# Patient Record
Sex: MALE | Race: WHITE | URBAN - METROPOLITAN AREA
[De-identification: names, ages, dates, MRNs, and addresses within clinical notes are randomized per-mention and may not be internally consistent; named-entity substitution may affect disease eponyms.]

---

## 2017-04-16 ENCOUNTER — EMERGENCY (EMERGENCY)
Facility: HOSPITAL | Age: 57
LOS: 0 days | Discharge: HOME | End: 2017-04-17
Admitting: INTERNAL MEDICINE

## 2017-04-16 PROBLEM — Z00.00 ENCOUNTER FOR PREVENTIVE HEALTH EXAMINATION: Status: ACTIVE | Noted: 2017-04-16

## 2017-06-27 DIAGNOSIS — S80.211A ABRASION, RIGHT KNEE, INITIAL ENCOUNTER: ICD-10-CM

## 2017-06-27 DIAGNOSIS — F32.9 MAJOR DEPRESSIVE DISORDER, SINGLE EPISODE, UNSPECIFIED: ICD-10-CM

## 2017-06-27 DIAGNOSIS — F10.129 ALCOHOL ABUSE WITH INTOXICATION, UNSPECIFIED: ICD-10-CM

## 2017-06-27 DIAGNOSIS — S80.212A ABRASION, LEFT KNEE, INITIAL ENCOUNTER: ICD-10-CM
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
## 2017-06-27 DIAGNOSIS — Z98.890 OTHER SPECIFIED POSTPROCEDURAL STATES: ICD-10-CM

## 2017-06-27 DIAGNOSIS — R00.0 TACHYCARDIA, UNSPECIFIED: ICD-10-CM

## 2017-11-25 ENCOUNTER — EMERGENCY (EMERGENCY)
Facility: HOSPITAL | Age: 57
LOS: 0 days | Discharge: LEFT BEFORE DISCHARGE | End: 2017-11-25

## 2017-11-25 DIAGNOSIS — R56.9 UNSPECIFIED CONVULSIONS: ICD-10-CM

## 2017-11-25 DIAGNOSIS — K85.90 ACUTE PANCREATITIS WITHOUT NECROSIS OR INFECTION, UNSPECIFIED: ICD-10-CM

## 2017-11-25 DIAGNOSIS — F10.129 ALCOHOL ABUSE WITH INTOXICATION, UNSPECIFIED: ICD-10-CM

## 2017-11-25 DIAGNOSIS — F10.20 ALCOHOL DEPENDENCE, UNCOMPLICATED: ICD-10-CM

## 2017-11-25 DIAGNOSIS — F31.9 BIPOLAR DISORDER, UNSPECIFIED: ICD-10-CM

## 2017-11-25 DIAGNOSIS — F93.0 SEPARATION ANXIETY DISORDER OF CHILDHOOD: ICD-10-CM
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
## 2017-11-25 DIAGNOSIS — F32.9 MAJOR DEPRESSIVE DISORDER, SINGLE EPISODE, UNSPECIFIED: ICD-10-CM

## 2017-11-25 DIAGNOSIS — Z79.899 OTHER LONG TERM (CURRENT) DRUG THERAPY: ICD-10-CM

## 2017-11-25 DIAGNOSIS — Z98.890 OTHER SPECIFIED POSTPROCEDURAL STATES: ICD-10-CM

## 2017-11-25 DIAGNOSIS — E66.9 OBESITY, UNSPECIFIED: ICD-10-CM

## 2017-11-25 DIAGNOSIS — F41.9 ANXIETY DISORDER, UNSPECIFIED: ICD-10-CM

## 2017-11-25 DIAGNOSIS — K27.9 PEPTIC ULCER, SITE UNSPECIFIED, UNSPECIFIED AS ACUTE OR CHRONIC, WITHOUT HEMORRHAGE OR PERFORATION: ICD-10-CM

## 2017-11-25 DIAGNOSIS — K31.1 ADULT HYPERTROPHIC PYLORIC STENOSIS: ICD-10-CM

## 2017-11-25 DIAGNOSIS — F10.230 ALCOHOL DEPENDENCE WITH WITHDRAWAL, UNCOMPLICATED: ICD-10-CM

## 2017-11-25 DIAGNOSIS — R07.89 OTHER CHEST PAIN: ICD-10-CM

## 2017-11-25 DIAGNOSIS — K56.609 UNSPECIFIED INTESTINAL OBSTRUCTION, UNSPECIFIED AS TO PARTIAL VERSUS COMPLETE OBSTRUCTION: ICD-10-CM

## 2017-12-08 ENCOUNTER — INPATIENT (INPATIENT)
Facility: HOSPITAL | Age: 57
LOS: 2 days | Discharge: HOME | End: 2017-12-11
Attending: INTERNAL MEDICINE | Admitting: INTERNAL MEDICINE

## 2017-12-08 DIAGNOSIS — R07.89 OTHER CHEST PAIN: ICD-10-CM

## 2017-12-08 DIAGNOSIS — R56.9 UNSPECIFIED CONVULSIONS: ICD-10-CM

## 2017-12-08 DIAGNOSIS — K31.1 ADULT HYPERTROPHIC PYLORIC STENOSIS: ICD-10-CM

## 2017-12-08 DIAGNOSIS — K85.90 ACUTE PANCREATITIS WITHOUT NECROSIS OR INFECTION, UNSPECIFIED: ICD-10-CM

## 2017-12-08 DIAGNOSIS — F93.0 SEPARATION ANXIETY DISORDER OF CHILDHOOD: ICD-10-CM

## 2017-12-08 DIAGNOSIS — K27.9 PEPTIC ULCER, SITE UNSPECIFIED, UNSPECIFIED AS ACUTE OR CHRONIC, WITHOUT HEMORRHAGE OR PERFORATION: ICD-10-CM

## 2017-12-08 DIAGNOSIS — K56.609 UNSPECIFIED INTESTINAL OBSTRUCTION, UNSPECIFIED AS TO PARTIAL VERSUS COMPLETE OBSTRUCTION: ICD-10-CM

## 2017-12-08 DIAGNOSIS — F10.230 ALCOHOL DEPENDENCE WITH WITHDRAWAL, UNCOMPLICATED: ICD-10-CM

## 2017-12-08 DIAGNOSIS — F31.9 BIPOLAR DISORDER, UNSPECIFIED: ICD-10-CM

## 2017-12-08 DIAGNOSIS — F41.9 ANXIETY DISORDER, UNSPECIFIED: ICD-10-CM

## 2017-12-08 DIAGNOSIS — F10.20 ALCOHOL DEPENDENCE, UNCOMPLICATED: ICD-10-CM

## 2017-12-08 DIAGNOSIS — E66.9 OBESITY, UNSPECIFIED: ICD-10-CM
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
## 2017-12-19 DIAGNOSIS — E87.6 HYPOKALEMIA: ICD-10-CM

## 2017-12-19 DIAGNOSIS — R06.02 SHORTNESS OF BREATH: ICD-10-CM

## 2017-12-19 DIAGNOSIS — R07.9 CHEST PAIN, UNSPECIFIED: ICD-10-CM

## 2017-12-19 DIAGNOSIS — F10.239 ALCOHOL DEPENDENCE WITH WITHDRAWAL, UNSPECIFIED: ICD-10-CM

## 2017-12-19 DIAGNOSIS — K86.1 OTHER CHRONIC PANCREATITIS: ICD-10-CM

## 2017-12-19 DIAGNOSIS — Z87.19 PERSONAL HISTORY OF OTHER DISEASES OF THE DIGESTIVE SYSTEM: ICD-10-CM

## 2017-12-19 DIAGNOSIS — K52.9 NONINFECTIVE GASTROENTERITIS AND COLITIS, UNSPECIFIED: ICD-10-CM

## 2017-12-19 DIAGNOSIS — K27.9 PEPTIC ULCER, SITE UNSPECIFIED, UNSPECIFIED AS ACUTE OR CHRONIC, WITHOUT HEMORRHAGE OR PERFORATION: ICD-10-CM

## 2017-12-19 DIAGNOSIS — R07.89 OTHER CHEST PAIN: ICD-10-CM

## 2017-12-19 DIAGNOSIS — E83.42 HYPOMAGNESEMIA: ICD-10-CM

## 2017-12-19 DIAGNOSIS — F41.9 ANXIETY DISORDER, UNSPECIFIED: ICD-10-CM

## 2017-12-19 DIAGNOSIS — K70.9 ALCOHOLIC LIVER DISEASE, UNSPECIFIED: ICD-10-CM

## 2017-12-19 DIAGNOSIS — F32.9 MAJOR DEPRESSIVE DISORDER, SINGLE EPISODE, UNSPECIFIED: ICD-10-CM

## 2018-01-25 ENCOUNTER — EMERGENCY (EMERGENCY)
Facility: HOSPITAL | Age: 58
LOS: 0 days | Discharge: HOME | End: 2018-01-26
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
## 2018-01-25 DIAGNOSIS — K56.609 UNSPECIFIED INTESTINAL OBSTRUCTION, UNSPECIFIED AS TO PARTIAL VERSUS COMPLETE OBSTRUCTION: ICD-10-CM
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
## 2018-01-25 DIAGNOSIS — F10.20 ALCOHOL DEPENDENCE, UNCOMPLICATED: ICD-10-CM

## 2018-01-25 DIAGNOSIS — F31.9 BIPOLAR DISORDER, UNSPECIFIED: ICD-10-CM

## 2018-01-25 DIAGNOSIS — K27.9 PEPTIC ULCER, SITE UNSPECIFIED, UNSPECIFIED AS ACUTE OR CHRONIC, WITHOUT HEMORRHAGE OR PERFORATION: ICD-10-CM

## 2018-01-25 DIAGNOSIS — Z23 ENCOUNTER FOR IMMUNIZATION: ICD-10-CM

## 2018-01-25 DIAGNOSIS — R07.89 OTHER CHEST PAIN: ICD-10-CM

## 2018-01-25 DIAGNOSIS — R40.1 STUPOR: ICD-10-CM

## 2018-01-25 DIAGNOSIS — K85.90 ACUTE PANCREATITIS WITHOUT NECROSIS OR INFECTION, UNSPECIFIED: ICD-10-CM

## 2018-01-25 DIAGNOSIS — F93.0 SEPARATION ANXIETY DISORDER OF CHILDHOOD: ICD-10-CM
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
## 2018-01-25 DIAGNOSIS — K31.1 ADULT HYPERTROPHIC PYLORIC STENOSIS: ICD-10-CM

## 2018-01-25 DIAGNOSIS — F41.9 ANXIETY DISORDER, UNSPECIFIED: ICD-10-CM

## 2018-01-25 DIAGNOSIS — E66.9 OBESITY, UNSPECIFIED: ICD-10-CM

## 2018-01-25 DIAGNOSIS — F10.129 ALCOHOL ABUSE WITH INTOXICATION, UNSPECIFIED: ICD-10-CM

## 2018-01-25 DIAGNOSIS — S01.81XA LACERATION WITHOUT FOREIGN BODY OF OTHER PART OF HEAD, INITIAL ENCOUNTER: ICD-10-CM

## 2018-01-25 DIAGNOSIS — R56.9 UNSPECIFIED CONVULSIONS: ICD-10-CM
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
## 2018-01-25 DIAGNOSIS — Z79.899 OTHER LONG TERM (CURRENT) DRUG THERAPY: ICD-10-CM

## 2018-01-25 DIAGNOSIS — F10.230 ALCOHOL DEPENDENCE WITH WITHDRAWAL, UNCOMPLICATED: ICD-10-CM
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
## 2018-01-25 DIAGNOSIS — Z98.890 OTHER SPECIFIED POSTPROCEDURAL STATES: ICD-10-CM

## 2018-01-25 DIAGNOSIS — F32.9 MAJOR DEPRESSIVE DISORDER, SINGLE EPISODE, UNSPECIFIED: ICD-10-CM

## 2018-02-01 ENCOUNTER — EMERGENCY (EMERGENCY)
Facility: HOSPITAL | Age: 58
LOS: 0 days | Discharge: HOME | End: 2018-02-01

## 2018-02-01 DIAGNOSIS — Z79.899 OTHER LONG TERM (CURRENT) DRUG THERAPY: ICD-10-CM

## 2018-02-01 DIAGNOSIS — F93.0 SEPARATION ANXIETY DISORDER OF CHILDHOOD: ICD-10-CM

## 2018-02-01 DIAGNOSIS — F31.9 BIPOLAR DISORDER, UNSPECIFIED: ICD-10-CM

## 2018-02-01 DIAGNOSIS — K56.609 UNSPECIFIED INTESTINAL OBSTRUCTION, UNSPECIFIED AS TO PARTIAL VERSUS COMPLETE OBSTRUCTION: ICD-10-CM

## 2018-02-01 DIAGNOSIS — E66.9 OBESITY, UNSPECIFIED: ICD-10-CM

## 2018-02-01 DIAGNOSIS — R56.9 UNSPECIFIED CONVULSIONS: ICD-10-CM

## 2018-02-01 DIAGNOSIS — S01.111D LACERATION WITHOUT FOREIGN BODY OF RIGHT EYELID AND PERIOCULAR AREA, SUBSEQUENT ENCOUNTER: ICD-10-CM
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
## 2018-02-01 DIAGNOSIS — F10.230 ALCOHOL DEPENDENCE WITH WITHDRAWAL, UNCOMPLICATED: ICD-10-CM

## 2018-02-01 DIAGNOSIS — K85.90 ACUTE PANCREATITIS WITHOUT NECROSIS OR INFECTION, UNSPECIFIED: ICD-10-CM

## 2018-02-01 DIAGNOSIS — K27.9 PEPTIC ULCER, SITE UNSPECIFIED, UNSPECIFIED AS ACUTE OR CHRONIC, WITHOUT HEMORRHAGE OR PERFORATION: ICD-10-CM

## 2018-02-01 DIAGNOSIS — F32.9 MAJOR DEPRESSIVE DISORDER, SINGLE EPISODE, UNSPECIFIED: ICD-10-CM

## 2018-02-01 DIAGNOSIS — K31.1 ADULT HYPERTROPHIC PYLORIC STENOSIS: ICD-10-CM

## 2018-02-01 DIAGNOSIS — F10.20 ALCOHOL DEPENDENCE, UNCOMPLICATED: ICD-10-CM

## 2018-02-01 DIAGNOSIS — Z98.890 OTHER SPECIFIED POSTPROCEDURAL STATES: ICD-10-CM

## 2018-02-01 DIAGNOSIS — R07.89 OTHER CHEST PAIN: ICD-10-CM

## 2018-02-01 DIAGNOSIS — F41.9 ANXIETY DISORDER, UNSPECIFIED: ICD-10-CM

## 2018-04-11 ENCOUNTER — EMERGENCY (EMERGENCY)
Facility: HOSPITAL | Age: 58
LOS: 0 days | Discharge: HOME | End: 2018-04-12
Attending: EMERGENCY MEDICINE

## 2018-04-11 VITALS
TEMPERATURE: 98 F | OXYGEN SATURATION: 98 % | RESPIRATION RATE: 20 BRPM | DIASTOLIC BLOOD PRESSURE: 80 MMHG | SYSTOLIC BLOOD PRESSURE: 121 MMHG | HEART RATE: 71 BPM

## 2018-04-11 DIAGNOSIS — S60.511A ABRASION OF RIGHT HAND, INITIAL ENCOUNTER: ICD-10-CM

## 2018-04-11 DIAGNOSIS — S30.1XXA CONTUSION OF ABDOMINAL WALL, INITIAL ENCOUNTER: ICD-10-CM
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
## 2018-04-11 DIAGNOSIS — S80.812A ABRASION, LEFT LOWER LEG, INITIAL ENCOUNTER: ICD-10-CM
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
## 2018-04-11 DIAGNOSIS — F10.129 ALCOHOL ABUSE WITH INTOXICATION, UNSPECIFIED: ICD-10-CM

## 2018-04-11 DIAGNOSIS — S80.811A ABRASION, RIGHT LOWER LEG, INITIAL ENCOUNTER: ICD-10-CM

## 2018-04-11 DIAGNOSIS — S01.81XA LACERATION WITHOUT FOREIGN BODY OF OTHER PART OF HEAD, INITIAL ENCOUNTER: ICD-10-CM
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
## 2018-04-11 LAB
ALBUMIN SERPL ELPH-MCNC: 4 G/DL — SIGNIFICANT CHANGE UP (ref 3.5–5.2)
ALLERGY+IMMUNOLOGY DIAG STUDY NOTE: SIGNIFICANT CHANGE UP
ALP SERPL-CCNC: 118 U/L — HIGH (ref 30–115)
ALT FLD-CCNC: 32 U/L — SIGNIFICANT CHANGE UP (ref 0–41)
AMYLASE P1 CFR SERPL: 77 U/L — SIGNIFICANT CHANGE UP (ref 25–115)
ANION GAP SERPL CALC-SCNC: 19 MMOL/L — HIGH (ref 7–14)
APTT BLD: 30.4 SEC — SIGNIFICANT CHANGE UP (ref 27–39.2)
AST SERPL-CCNC: 51 U/L — HIGH (ref 0–41)
BASOPHILS # BLD AUTO: 0.15 K/UL — SIGNIFICANT CHANGE UP (ref 0–0.2)
BASOPHILS NFR BLD AUTO: 1.9 % — HIGH (ref 0–1)
BILIRUB SERPL-MCNC: 0.8 MG/DL — SIGNIFICANT CHANGE UP (ref 0.2–1.2)
BUN SERPL-MCNC: 12 MG/DL — SIGNIFICANT CHANGE UP (ref 10–20)
CALCIUM SERPL-MCNC: 8 MG/DL — LOW (ref 8.5–10.1)
CHLORIDE SERPL-SCNC: 111 MMOL/L — HIGH (ref 98–110)
CO2 SERPL-SCNC: 19 MMOL/L — SIGNIFICANT CHANGE UP (ref 17–32)
CREAT SERPL-MCNC: 0.7 MG/DL — SIGNIFICANT CHANGE UP (ref 0.7–1.5)
EOSINOPHIL # BLD AUTO: 0.29 K/UL — SIGNIFICANT CHANGE UP (ref 0–0.7)
EOSINOPHIL NFR BLD AUTO: 3.7 % — SIGNIFICANT CHANGE UP (ref 0–8)
ETHANOL SERPL-MCNC: 292 MG/DL — HIGH
GLUCOSE SERPL-MCNC: 97 MG/DL — SIGNIFICANT CHANGE UP (ref 70–99)
HCT VFR BLD CALC: 43.7 % — SIGNIFICANT CHANGE UP (ref 42–52)
HGB BLD-MCNC: 15.9 G/DL — SIGNIFICANT CHANGE UP (ref 14–18)
IMM GRANULOCYTES NFR BLD AUTO: 0.6 % — HIGH (ref 0.1–0.3)
INR BLD: 1.14 RATIO — SIGNIFICANT CHANGE UP (ref 0.65–1.3)
LACTATE SERPL-SCNC: 2.9 MMOL/L — HIGH (ref 0.5–2.2)
LIDOCAIN IGE QN: <10 U/L — SIGNIFICANT CHANGE UP (ref 7–60)
LYMPHOCYTES # BLD AUTO: 2.43 K/UL — SIGNIFICANT CHANGE UP (ref 1.2–3.4)
LYMPHOCYTES # BLD AUTO: 31.2 % — SIGNIFICANT CHANGE UP (ref 20.5–51.1)
MCHC RBC-ENTMCNC: 34 PG — HIGH (ref 27–31)
MCHC RBC-ENTMCNC: 36.4 G/DL — SIGNIFICANT CHANGE UP (ref 32–37)
MCV RBC AUTO: 93.6 FL — SIGNIFICANT CHANGE UP (ref 80–94)
MONOCYTES # BLD AUTO: 0.62 K/UL — HIGH (ref 0.1–0.6)
MONOCYTES NFR BLD AUTO: 8 % — SIGNIFICANT CHANGE UP (ref 1.7–9.3)
NEUTROPHILS # BLD AUTO: 4.24 K/UL — SIGNIFICANT CHANGE UP (ref 1.4–6.5)
NEUTROPHILS NFR BLD AUTO: 54.6 % — SIGNIFICANT CHANGE UP (ref 42.2–75.2)
NRBC # BLD: 0 /100 WBCS — SIGNIFICANT CHANGE UP (ref 0–0)
PLATELET # BLD AUTO: 180 K/UL — SIGNIFICANT CHANGE UP (ref 130–400)
POTASSIUM SERPL-MCNC: 4 MMOL/L — SIGNIFICANT CHANGE UP (ref 3.5–5)
POTASSIUM SERPL-SCNC: 4 MMOL/L — SIGNIFICANT CHANGE UP (ref 3.5–5)
PROT SERPL-MCNC: 6.8 G/DL — SIGNIFICANT CHANGE UP (ref 6–8)
PROTHROM AB SERPL-ACNC: 12.4 SEC — SIGNIFICANT CHANGE UP (ref 9.95–12.87)
RBC # BLD: 4.67 M/UL — LOW (ref 4.7–6.1)
RBC # FLD: 12.6 % — SIGNIFICANT CHANGE UP (ref 11.5–14.5)
SODIUM SERPL-SCNC: 149 MMOL/L — HIGH (ref 135–146)
TYPE + AB SCN PNL BLD: SIGNIFICANT CHANGE UP
WBC # BLD: 7.78 K/UL — SIGNIFICANT CHANGE UP (ref 4.8–10.8)
WBC # FLD AUTO: 7.78 K/UL — SIGNIFICANT CHANGE UP (ref 4.8–10.8)

## 2018-04-11 RX ORDER — SODIUM CHLORIDE 9 MG/ML
1000 INJECTION INTRAMUSCULAR; INTRAVENOUS; SUBCUTANEOUS ONCE
Qty: 0 | Refills: 0 | Status: COMPLETED | OUTPATIENT
Start: 2018-04-11 | End: 2018-04-11

## 2018-04-11 RX ADMIN — SODIUM CHLORIDE 2 MILLILITER(S): 9 INJECTION INTRAMUSCULAR; INTRAVENOUS; SUBCUTANEOUS at 16:40

## 2018-04-11 NOTE — ED PROVIDER NOTE - OBJECTIVE STATEMENT
56 yo m reports s/p fall down stairs at home with no LOC.  BIB EMS s/p fall down several steps. No LOC. No blood thinners. Pt. has ETOH abuse and appears to be disoriented. Lacerations on chin and abrasion legs. No neurologic deficit. 58 yo m reports s/p fall down stairs at home with no LOC.  BIB EMS s/p fall down several steps. No LOC. No blood thinners. Pt. has ETOH abuse and appears to be disoriented and intoxicated with smell of ETOH. Lacerations on chin and abrasion legs. No neurologic deficit.    I have reviewed available current nursing and previous documentation of past medical, surgical, family, and/or social history.

## 2018-04-11 NOTE — ED PROVIDER NOTE - PHYSICAL EXAMINATION
Physical Exam    Vital Signs: I have reviewed the initial vital signs.  Constitutional: pt. laying down with C-spine collar with presence of strong ETOH smell.  Eyes: PERRLA, EOM intact, no conjunctival injection, and symmetrical lids.  ENT: Neck supple with no adenopathy, moist MM, no loose teeth, no bleeding in mouth, no TTP along the facial bones.  Cardiovascular: regular rate, regular rhythm, well-perfused extremities, radial pulses +2 b/l, Dp pulses +2 b/l.  Respiratory: unlabored respiratory effort, clear to auscultation bilaterally, does not appear to be in distress.  Gastrointestinal: soft, non-tender abdomen, no pulsatile mass  Musculoskeletal: supple neck, no lower extremity edema, no midline spinal TTP, appears to be moving all extremities equally, no gross deformity noted, no TTP along the UE and LE.  Integumentary: (+) abrasion on shin and feet b/l. (+) bruising 2 cm in diameter on right flank. (+) abrasions on right hand. (+) chin laceration 1.5 cm long not actively bleeding. Other wise the rest of exam is warm, dry, no rash  Neurologic: awake, moving all limbs equally with good strength does not appear to have focal neurologic deficit. No facial droop or weakness noted.  Psychiatric: Appears intoxicated not answering questions asked, keeps saying "thank you."

## 2018-04-11 NOTE — H&P ADULT - ATTENDING COMMENTS
s/p fall , intoxicated with chin laceration   HD stable   will pan scan   will repair chin laceration

## 2018-04-11 NOTE — ED PROVIDER NOTE - ATTENDING CONTRIBUTION TO CARE
I have reviewed triage notes and vital signs available at this time.  I have reviewed lab results, if any, available at this time.  I have reviewed EKGs, if any, available at this time.     I have reviewed radiology results and radiographs/scans, if any, available at this time.      I have personally seen, evaluated and participated in this patient's care and find this patient's history and physical examination are consistent with the chart documentation, unless noted below.  ***  patient here s/p trauma. ems notification. trauma code activated. hemodynamically stable. CT done and pending read by radiology.    patient care transferred to Dr. Lei. pendig ct read and trauma surgery final rec.

## 2018-04-11 NOTE — ED PROVIDER NOTE - MEDICAL DECISION MAKING DETAILS
I personally evaluated the patient. I reviewed the Resident’s or Physician Assistant’s note (as assigned above), and agree with the findings and plan except as documented in my note. Patient remains intoxicated, + alcohol on breath pt signed out to me - 57y m h/o etoh abuse no blood thinners p/w fall down 12 steps from intox - trauma code activated, panCT performed w/no acute life-threatening injuries - cleared by Trauma team - pt observed in ED until clinically sober, w/steady gait, picked up by wife

## 2018-04-11 NOTE — H&P ADULT - HISTORY OF PRESENT ILLNESS
HPI: S/p fall down several steps. No LOC. No blood thinners. Patient EtOH abuse and desoriented. Abrasions on legs. Laceration on chin. No neurological deficits.

## 2018-04-11 NOTE — ED ADULT NURSE REASSESSMENT NOTE - NS ED NURSE REASSESS COMMENT FT1
Attempted to walk patient, patient unable to ambulate steady on own but insisting on using the bathroom regardless of urinal provided.  PCA brought patient to the bathroom in wherelchair and placed back in stretcher with fall risk precautions maintained.

## 2018-04-11 NOTE — H&P ADULT - NSHPPHYSICALEXAM_GEN_ALL_CORE
PHYSICAL EXAM:  General Appearance: NAD, confused  Neck: Supple  Chest: Equal expansion bilaterally, equal breath sounds  CV: S1, S2, RRR  Abdomen: Soft, NT, ND  Extremities: B/l knee abrasions. DP/PT intact. Radial and ulnar pulses intact  Neuro: GCS 14, confusion

## 2018-04-11 NOTE — ED PROVIDER NOTE - UNABLE TO OBTAIN
pt. appears intoxicated with smell of ETOH uncooperative with exam Unresponsive ETOH abuse unresponsive to questions ETOH abuse unresponsive to questions and uncooperative with exam

## 2018-04-12 VITALS
HEART RATE: 98 BPM | DIASTOLIC BLOOD PRESSURE: 71 MMHG | SYSTOLIC BLOOD PRESSURE: 118 MMHG | TEMPERATURE: 98 F | OXYGEN SATURATION: 97 % | RESPIRATION RATE: 20 BRPM

## 2018-04-12 RX ADMIN — Medication 2 MILLIGRAM(S): at 01:30

## 2018-04-12 NOTE — ED ADULT NURSE REASSESSMENT NOTE - NS ED NURSE REASSESS COMMENT FT1
patient received from evening rn. patient is a\o x3. patient appears to be intoxicated at this time. patient stating he feels shaking. MD made aware. will reassess. fall precautions in place at all times.

## 2018-05-03 ENCOUNTER — INPATIENT (INPATIENT)
Facility: HOSPITAL | Age: 58
LOS: 2 days | Discharge: HOME | End: 2018-05-06
Attending: SURGERY | Admitting: SURGERY
Payer: MEDICAID

## 2018-05-03 VITALS
SYSTOLIC BLOOD PRESSURE: 131 MMHG | RESPIRATION RATE: 18 BRPM | DIASTOLIC BLOOD PRESSURE: 80 MMHG | OXYGEN SATURATION: 98 % | HEART RATE: 66 BPM

## 2018-05-03 LAB
ALBUMIN SERPL ELPH-MCNC: 4.6 G/DL — SIGNIFICANT CHANGE UP (ref 3.5–5.2)
ALP SERPL-CCNC: 137 U/L — HIGH (ref 30–115)
ALT FLD-CCNC: 58 U/L — HIGH (ref 0–41)
ANION GAP SERPL CALC-SCNC: 13 MMOL/L — SIGNIFICANT CHANGE UP (ref 7–14)
APPEARANCE UR: CLEAR — SIGNIFICANT CHANGE UP
APTT BLD: 31.5 SEC — SIGNIFICANT CHANGE UP (ref 27–39.2)
AST SERPL-CCNC: 37 U/L — SIGNIFICANT CHANGE UP (ref 0–41)
BACTERIA # UR AUTO: (no result) /HPF
BASOPHILS # BLD AUTO: 0.06 K/UL — SIGNIFICANT CHANGE UP (ref 0–0.2)
BASOPHILS NFR BLD AUTO: 0.6 % — SIGNIFICANT CHANGE UP (ref 0–1)
BILIRUB SERPL-MCNC: 0.6 MG/DL — SIGNIFICANT CHANGE UP (ref 0.2–1.2)
BILIRUB UR-MCNC: NEGATIVE — SIGNIFICANT CHANGE UP
BLD GP AB SCN SERPL QL: SIGNIFICANT CHANGE UP
BUN SERPL-MCNC: 13 MG/DL — SIGNIFICANT CHANGE UP (ref 10–20)
CALCIUM SERPL-MCNC: 9.4 MG/DL — SIGNIFICANT CHANGE UP (ref 8.5–10.1)
CHLORIDE SERPL-SCNC: 103 MMOL/L — SIGNIFICANT CHANGE UP (ref 98–110)
CO2 SERPL-SCNC: 22 MMOL/L — SIGNIFICANT CHANGE UP (ref 17–32)
COLOR SPEC: YELLOW — SIGNIFICANT CHANGE UP
CREAT SERPL-MCNC: 0.9 MG/DL — SIGNIFICANT CHANGE UP (ref 0.7–1.5)
DIFF PNL FLD: NEGATIVE — SIGNIFICANT CHANGE UP
EOSINOPHIL # BLD AUTO: 0 K/UL — SIGNIFICANT CHANGE UP (ref 0–0.7)
EOSINOPHIL NFR BLD AUTO: 0 % — SIGNIFICANT CHANGE UP (ref 0–8)
GLUCOSE SERPL-MCNC: 166 MG/DL — HIGH (ref 70–99)
GLUCOSE UR QL: NEGATIVE — SIGNIFICANT CHANGE UP
HCT VFR BLD CALC: 45.7 % — SIGNIFICANT CHANGE UP (ref 42–52)
HGB BLD-MCNC: 15.9 G/DL — SIGNIFICANT CHANGE UP (ref 14–18)
IMM GRANULOCYTES NFR BLD AUTO: 0.2 % — SIGNIFICANT CHANGE UP (ref 0.1–0.3)
INR BLD: 1.1 RATIO — SIGNIFICANT CHANGE UP (ref 0.65–1.3)
KETONES UR-MCNC: NEGATIVE — SIGNIFICANT CHANGE UP
LACTATE SERPL-SCNC: 1.6 MMOL/L — SIGNIFICANT CHANGE UP (ref 0.5–2.2)
LEUKOCYTE ESTERASE UR-ACNC: NEGATIVE — SIGNIFICANT CHANGE UP
LIDOCAIN IGE QN: 10 U/L — SIGNIFICANT CHANGE UP (ref 7–60)
LYMPHOCYTES # BLD AUTO: 0.7 K/UL — LOW (ref 1.2–3.4)
LYMPHOCYTES # BLD AUTO: 7.1 % — LOW (ref 20.5–51.1)
MAGNESIUM SERPL-MCNC: 2 MG/DL — SIGNIFICANT CHANGE UP (ref 1.8–2.4)
MCHC RBC-ENTMCNC: 33.4 PG — HIGH (ref 27–31)
MCHC RBC-ENTMCNC: 34.8 G/DL — SIGNIFICANT CHANGE UP (ref 32–37)
MCV RBC AUTO: 96 FL — HIGH (ref 80–94)
MONOCYTES # BLD AUTO: 0.3 K/UL — SIGNIFICANT CHANGE UP (ref 0.1–0.6)
MONOCYTES NFR BLD AUTO: 3.1 % — SIGNIFICANT CHANGE UP (ref 1.7–9.3)
NEUTROPHILS # BLD AUTO: 8.75 K/UL — HIGH (ref 1.4–6.5)
NEUTROPHILS NFR BLD AUTO: 89 % — HIGH (ref 42.2–75.2)
NITRITE UR-MCNC: NEGATIVE — SIGNIFICANT CHANGE UP
NRBC # BLD: 0 /100 WBCS — SIGNIFICANT CHANGE UP (ref 0–0)
PH UR: 5.5 — SIGNIFICANT CHANGE UP (ref 5–8)
PLATELET # BLD AUTO: 213 K/UL — SIGNIFICANT CHANGE UP (ref 130–400)
POTASSIUM SERPL-MCNC: 4.3 MMOL/L — SIGNIFICANT CHANGE UP (ref 3.5–5)
POTASSIUM SERPL-SCNC: 4.3 MMOL/L — SIGNIFICANT CHANGE UP (ref 3.5–5)
PROT SERPL-MCNC: 7.7 G/DL — SIGNIFICANT CHANGE UP (ref 6–8)
PROT UR-MCNC: (no result)
PROTHROM AB SERPL-ACNC: 11.9 SEC — SIGNIFICANT CHANGE UP (ref 9.95–12.87)
RBC # BLD: 4.76 M/UL — SIGNIFICANT CHANGE UP (ref 4.7–6.1)
RBC # FLD: 13.4 % — SIGNIFICANT CHANGE UP (ref 11.5–14.5)
SODIUM SERPL-SCNC: 138 MMOL/L — SIGNIFICANT CHANGE UP (ref 135–146)
SP GR SPEC: >=1.03 — SIGNIFICANT CHANGE UP (ref 1.01–1.03)
TYPE + AB SCN PNL BLD: SIGNIFICANT CHANGE UP
UROBILINOGEN FLD QL: 0.2 — SIGNIFICANT CHANGE UP (ref 0.2–0.2)
WBC # BLD: 9.83 K/UL — SIGNIFICANT CHANGE UP (ref 4.8–10.8)
WBC # FLD AUTO: 9.83 K/UL — SIGNIFICANT CHANGE UP (ref 4.8–10.8)

## 2018-05-03 PROCEDURE — 99223 1ST HOSP IP/OBS HIGH 75: CPT

## 2018-05-03 RX ORDER — ONDANSETRON 8 MG/1
4 TABLET, FILM COATED ORAL ONCE
Qty: 0 | Refills: 0 | Status: COMPLETED | OUTPATIENT
Start: 2018-05-03 | End: 2018-05-03

## 2018-05-03 RX ORDER — DIATRIZOATE MEGLUMINE 180 MG/ML
20 INJECTION, SOLUTION INTRAVESICAL ONCE
Qty: 0 | Refills: 0 | Status: COMPLETED | OUTPATIENT
Start: 2018-05-03 | End: 2018-05-03

## 2018-05-03 RX ORDER — HEPARIN SODIUM 5000 [USP'U]/ML
5000 INJECTION INTRAVENOUS; SUBCUTANEOUS EVERY 8 HOURS
Qty: 0 | Refills: 0 | Status: DISCONTINUED | OUTPATIENT
Start: 2018-05-03 | End: 2018-05-06

## 2018-05-03 RX ORDER — MORPHINE SULFATE 50 MG/1
2 CAPSULE, EXTENDED RELEASE ORAL ONCE
Qty: 0 | Refills: 0 | Status: DISCONTINUED | OUTPATIENT
Start: 2018-05-03 | End: 2018-05-03

## 2018-05-03 RX ORDER — MORPHINE SULFATE 50 MG/1
8 CAPSULE, EXTENDED RELEASE ORAL ONCE
Qty: 0 | Refills: 0 | Status: DISCONTINUED | OUTPATIENT
Start: 2018-05-03 | End: 2018-05-03

## 2018-05-03 RX ORDER — SODIUM CHLORIDE 9 MG/ML
1000 INJECTION INTRAMUSCULAR; INTRAVENOUS; SUBCUTANEOUS
Qty: 0 | Refills: 0 | Status: DISCONTINUED | OUTPATIENT
Start: 2018-05-03 | End: 2018-05-05

## 2018-05-03 RX ORDER — MORPHINE SULFATE 50 MG/1
2 CAPSULE, EXTENDED RELEASE ORAL EVERY 4 HOURS
Qty: 0 | Refills: 0 | Status: DISCONTINUED | OUTPATIENT
Start: 2018-05-03 | End: 2018-05-06

## 2018-05-03 RX ORDER — SODIUM CHLORIDE 9 MG/ML
2000 INJECTION INTRAMUSCULAR; INTRAVENOUS; SUBCUTANEOUS ONCE
Qty: 0 | Refills: 0 | Status: COMPLETED | OUTPATIENT
Start: 2018-05-03 | End: 2018-05-03

## 2018-05-03 RX ORDER — PANTOPRAZOLE SODIUM 20 MG/1
40 TABLET, DELAYED RELEASE ORAL DAILY
Qty: 0 | Refills: 0 | Status: DISCONTINUED | OUTPATIENT
Start: 2018-05-03 | End: 2018-05-06

## 2018-05-03 RX ORDER — MORPHINE SULFATE 50 MG/1
6 CAPSULE, EXTENDED RELEASE ORAL ONCE
Qty: 0 | Refills: 0 | Status: DISCONTINUED | OUTPATIENT
Start: 2018-05-03 | End: 2018-05-03

## 2018-05-03 RX ADMIN — MORPHINE SULFATE 2 MILLIGRAM(S): 50 CAPSULE, EXTENDED RELEASE ORAL at 14:04

## 2018-05-03 RX ADMIN — MORPHINE SULFATE 8 MILLIGRAM(S): 50 CAPSULE, EXTENDED RELEASE ORAL at 07:39

## 2018-05-03 RX ADMIN — MORPHINE SULFATE 6 MILLIGRAM(S): 50 CAPSULE, EXTENDED RELEASE ORAL at 10:10

## 2018-05-03 RX ADMIN — HEPARIN SODIUM 5000 UNIT(S): 5000 INJECTION INTRAVENOUS; SUBCUTANEOUS at 21:34

## 2018-05-03 RX ADMIN — DIATRIZOATE MEGLUMINE 20 MILLILITER(S): 180 INJECTION, SOLUTION INTRAVESICAL at 07:39

## 2018-05-03 RX ADMIN — MORPHINE SULFATE 2 MILLIGRAM(S): 50 CAPSULE, EXTENDED RELEASE ORAL at 21:28

## 2018-05-03 RX ADMIN — SODIUM CHLORIDE 2000 MILLILITER(S): 9 INJECTION INTRAMUSCULAR; INTRAVENOUS; SUBCUTANEOUS at 07:40

## 2018-05-03 RX ADMIN — PANTOPRAZOLE SODIUM 40 MILLIGRAM(S): 20 TABLET, DELAYED RELEASE ORAL at 21:28

## 2018-05-03 RX ADMIN — ONDANSETRON 4 MILLIGRAM(S): 8 TABLET, FILM COATED ORAL at 07:40

## 2018-05-03 RX ADMIN — MORPHINE SULFATE 6 MILLIGRAM(S): 50 CAPSULE, EXTENDED RELEASE ORAL at 11:21

## 2018-05-03 RX ADMIN — MORPHINE SULFATE 2 MILLIGRAM(S): 50 CAPSULE, EXTENDED RELEASE ORAL at 17:03

## 2018-05-03 RX ADMIN — SODIUM CHLORIDE 100 MILLILITER(S): 9 INJECTION INTRAMUSCULAR; INTRAVENOUS; SUBCUTANEOUS at 17:05

## 2018-05-03 RX ADMIN — MORPHINE SULFATE 2 MILLIGRAM(S): 50 CAPSULE, EXTENDED RELEASE ORAL at 15:04

## 2018-05-03 NOTE — ED PROVIDER NOTE - MEDICAL DECISION MAKING DETAILS
SBO. No emesis. Given fluids, pain and nausea control, well appearing, hemodynamically stable, afebrile. Admitted to surgery.

## 2018-05-03 NOTE — H&P ADULT - ATTENDING COMMENTS
initial trial of nonop mgmt of SBO considering extensive abdominal surgical history, low threshold for OR   discussed with patient, he understands the possibility of OR

## 2018-05-03 NOTE — ED PROVIDER NOTE - OBJECTIVE STATEMENT
57yoM with h/o multiple hernia repairs with Dr. Campbell, pancreatitis who p/w generalized abdominal pain and firmness/hernias sticking out since yesterday, severe, states entire abdomen and nonradiating. Last BM was yesterday evening and feels constipated and not passing gas. Associated nausea and decreased urination. Denies vomiting, fever.  Meds: seroquel, Xanax 57yoM with h/o multiple hernia repairs with Dr. Campbell/Jim, alcoholism, pancreatitis who p/w generalized abdominal pain and firmness/hernias sticking out since yesterday, severe, states entire abdomen and nonradiating. Last BM was yesterday evening and feels constipated and not passing gas. Associated nausea and decreased urination. Denies vomiting, fever.  Meds: seroquel, Xanax  [facility] proxy (Aan): [phone]

## 2018-05-03 NOTE — ED PROVIDER NOTE - PHYSICAL EXAMINATION
Afebrile, hemodynamically stable, saturating well  Appears in pain, groaning  Head NCAT  EOMI grossly  MMM  RRR, nml S1/S2, no m/r/g  Lungs CTAB, no w/r/r  Abd diffusely TTP with voluntary guarding difficult to examine, epigastric swelling c/w hernia that is too tender on attempted manual reduction, absent bs  AAO, CN's 3-12 grossly intact  SIMMONS spontaneously, no leg cyanosis or edema  Skin warm, well perfused, no rashes or hives

## 2018-05-03 NOTE — H&P ADULT - HISTORY OF PRESENT ILLNESS
SURGERY CONSULT  =======================================================================================  HPI: 57y Male   57yoM with surghx: ex lap silvestre patch complicated by incisional hernia repair w/ mesh , alcoholism, pancreatitis who p/w generalized abdominal pain   since yesterday, severe, states entire abdomen and nonradiating. Last BM was yesterday evening and feels constipated and not passing gas. Associated nausea and decreased urination. Denies vomiting, fever. Pt has sig  	Meds: seroquel, Xanax  Healthcare proxy (Ana): [phone]      PAST MEDICAL & SURGICAL HISTORY:  Hernia  Pancreatitis  Bowel obstruction  Anxiety  ex lap silvestre patch   incisional hernia repair  Home Meds: Home Medications:  SEROquel:  (03 May 2018 07:44)  Xanax 1 mg oral tablet:  (03 May 2018 07:44)    Allergies: Allergies    No Known Allergies    Intolerances      Soc:   Advanced Directives: Presumed Full Code     CURRENT MEDICATIONS:     --------------------------------------------------------------------------------------    VITAL SIGNS, INS/OUTS (last 24 hours):  --------------------------------------------------------------------------------------  ICU Vital Signs Last 24 Hrs  T(C): 36.6 (03 May 2018 08:15), Max: 36.6 (03 May 2018 08:15)  T(F): 97.8 (03 May 2018 08:15), Max: 97.8 (03 May 2018 08:15)  HR: 62 (03 May 2018 08:15) (62 - 66)  BP: 114/73 (03 May 2018 08:15) (114/73 - 131/80)  BP(mean): --  ABP: --  ABP(mean): --  RR: 18 (03 May 2018 08:15) (18 - 18)  SpO2: 96% (03 May 2018 08:15) (96% - 98%)    I&O's Summary    --------------------------------------------------------------------------------------    EXAM:  General/Neuro  GCS: 15  Exam: Normal, NAD, alert, oriented x 3, no focal deficits. PERRLA     Respiratory  Exam: Lungs clear to auscultation, Normal expansion/effort.     Cardiovascular  Exam: S1, S2.  Regular rate and rhythm.    Cardiac Rhythm: Normal Sinus Rhythm    GI  Exam: Abdomen soft,  mild tender diffuse, Non-distended.  reducible midline incisional hernia       Extremities  Exam: Extremities warm, pink, well-perfused.      Derm:  Exam: Good skin turgor, no skin breakdown.        LABS  --------------------------------------------------------------------------------------  Labs:  CAPILLARY BLOOD GLUCOSE                              15.9   9.83  )-----------( 213      ( 03 May 2018 07:31 )             45.7       Auto Neutrophil %: 89.0 % (05-03-18 @ 07:31)  Auto Immature Granulocyte %: 0.2 % (05-03-18 @ 07:31)    05-03    138  |  103  |  13  ----------------------------<  166<H>  4.3   |  22  |  0.9      Calcium, Total Serum: 9.4 mg/dL (05-03-18 @ 07:31)  Magnesium, Serum: 2.0 mg/dL (05-03-18 @ 07:31)      LFTs:             7.7  | 0.6  | 37       ------------------[137     ( 03 May 2018 07:31 )  4.6  | x    | 58          Lipase:10     Amylase:x         Lactate, Blood: 1.6 mmol/L (05-03-18 @ 07:31)      Coags:     11.90  ----< 1.10    ( 03 May 2018 07:31 )     31.5                  --------------------------------------------------------------------------------------    OTHER LABS    IMAGING RESULTS  < from: CT Abdomen and Pelvis w/ Oral Cont and w/ IV Cont (05.03.18 @ 09:42) >  There are multiple dilated loops of small   bowel which are fluid-filled with fecalization consistent with small   bowel obstruction. The point of transition appears to be adjacent to the   ventral hernia mesh on image 23 of series 3. There is no intraperitoneal   free air. The appendix appears unremarkable.    Findings consistent with small bowel obstruction with transition point at   a ventral hernia mesh repair    Indeterminate right renal lesion measuring 1.3 cm, stable in size.     ASSESSMENT/ PLAN:  57y Male ABD PAIN small bowel obstruction   - admit npo iv fluids pain control ng tube for decompression

## 2018-05-03 NOTE — CONSULT NOTE ADULT - SUBJECTIVE AND OBJECTIVE BOX
SURGERY CONSULT  ==============================================================================================================  HPI: 57y Male   57yoM with surghx: ex lap silvestre patch complicated by incisional hernia repair w/ mesh , alcoholism, pancreatitis who p/w generalized abdominal pain   since yesterday, severe, states entire abdomen and nonradiating. Last BM was yesterday evening and feels constipated and not passing gas. Associated nausea and decreased urination. Denies vomiting, fever. Pt has sig  	Meds: seroquel, Xanax  Healthcare proxy (Ana): [phone]      PAST MEDICAL & SURGICAL HISTORY:  Hernia  Pancreatitis  Bowel obstruction  Anxiety  No significant past surgical history    Home Meds: Home Medications:  SEROquel:  (03 May 2018 07:44)  Xanax 1 mg oral tablet:  (03 May 2018 07:44)    Allergies: Allergies    No Known Allergies    Intolerances      Soc:   Advanced Directives: Presumed Full Code     CURRENT MEDICATIONS:     --------------------------------------------------------------------------------------    VITAL SIGNS, INS/OUTS (last 24 hours):  --------------------------------------------------------------------------------------  ICU Vital Signs Last 24 Hrs  T(C): 36.6 (03 May 2018 08:15), Max: 36.6 (03 May 2018 08:15)  T(F): 97.8 (03 May 2018 08:15), Max: 97.8 (03 May 2018 08:15)  HR: 62 (03 May 2018 08:15) (62 - 66)  BP: 114/73 (03 May 2018 08:15) (114/73 - 131/80)  BP(mean): --  ABP: --  ABP(mean): --  RR: 18 (03 May 2018 08:15) (18 - 18)  SpO2: 96% (03 May 2018 08:15) (96% - 98%)    I&O's Summary    --------------------------------------------------------------------------------------    EXAM:  General/Neuro  GCS:   Exam: Normal, NAD, alert, oriented x 3, no focal deficits. PERRLA     Respiratory  Exam: Lungs clear to auscultation, Normal expansion/effort.     Cardiovascular  Exam: S1, S2.  Regular rate and rhythm.  Peripheral edema  ***  Cardiac Rhythm: Normal Sinus Rhythm    GI  Exam: Abdomen soft, Non-tender, Non-distended.    Wound:       Extremities  Exam: Extremities warm, pink, well-perfused.      Derm:  Exam: Good skin turgor, no skin breakdown.        LABS  --------------------------------------------------------------------------------------  Labs:  CAPILLARY BLOOD GLUCOSE                              15.9   9.83  )-----------( 213      ( 03 May 2018 07:31 )             45.7       Auto Neutrophil %: 89.0 % (05-03-18 @ 07:31)  Auto Immature Granulocyte %: 0.2 % (05-03-18 @ 07:31)    05-03    138  |  103  |  13  ----------------------------<  166<H>  4.3   |  22  |  0.9      Calcium, Total Serum: 9.4 mg/dL (05-03-18 @ 07:31)  Magnesium, Serum: 2.0 mg/dL (05-03-18 @ 07:31)      LFTs:             7.7  | 0.6  | 37       ------------------[137     ( 03 May 2018 07:31 )  4.6  | x    | 58          Lipase:10     Amylase:x         Lactate, Blood: 1.6 mmol/L (05-03-18 @ 07:31)      Coags:     11.90  ----< 1.10    ( 03 May 2018 07:31 )     31.5                  --------------------------------------------------------------------------------------    OTHER LABS    IMAGING RESULTS  < from: CT Abdomen and Pelvis w/ Oral Cont and w/ IV Cont (05.03.18 @ 09:42) >  There are multiple dilated loops of small   bowel which are fluid-filled with fecalization consistent with small   bowel obstruction. The point of transition appears to be adjacent to the   ventral hernia mesh on image 23 of series 3. There is no intraperitoneal   free air. The appendix appears unremarkable.    Findings consistent with small bowel obstruction with transition point at   a ventral hernia mesh repair    Indeterminate right renal lesion measuring 1.3 cm, stable in size.             ASSESSMENT/ PLAN:  57y Male ABD PAIN r/o bowel obstruction         Attending aware and agrees with plan SURGERY CONSULT  =======================================================================================  HPI: 57y Male   57yoM with surghx: ex lap silvestre patch complicated by incisional hernia repair w/ mesh , alcoholism, pancreatitis who p/w generalized abdominal pain   since yesterday, severe, states entire abdomen and nonradiating. Last BM was yesterday evening and feels constipated and not passing gas. Associated nausea and decreased urination. Denies vomiting, fever. Pt has sig  	Meds: seroquel, Xanax  Healthcare proxy (Ana): [phone]      PAST MEDICAL & SURGICAL HISTORY:  Hernia  Pancreatitis  Bowel obstruction  Anxiety  ex lap silvestre patch   incisional hernia repair  Home Meds: Home Medications:  SEROquel:  (03 May 2018 07:44)  Xanax 1 mg oral tablet:  (03 May 2018 07:44)    Allergies: Allergies    No Known Allergies    Intolerances      Soc:   Advanced Directives: Presumed Full Code     CURRENT MEDICATIONS:     --------------------------------------------------------------------------------------    VITAL SIGNS, INS/OUTS (last 24 hours):  --------------------------------------------------------------------------------------  ICU Vital Signs Last 24 Hrs  T(C): 36.6 (03 May 2018 08:15), Max: 36.6 (03 May 2018 08:15)  T(F): 97.8 (03 May 2018 08:15), Max: 97.8 (03 May 2018 08:15)  HR: 62 (03 May 2018 08:15) (62 - 66)  BP: 114/73 (03 May 2018 08:15) (114/73 - 131/80)  BP(mean): --  ABP: --  ABP(mean): --  RR: 18 (03 May 2018 08:15) (18 - 18)  SpO2: 96% (03 May 2018 08:15) (96% - 98%)    I&O's Summary    --------------------------------------------------------------------------------------    EXAM:  General/Neuro  GCS:   Exam: Normal, NAD, alert, oriented x 3, no focal deficits. PERRLA     Respiratory  Exam: Lungs clear to auscultation, Normal expansion/effort.     Cardiovascular  Exam: S1, S2.  Regular rate and rhythm.  Peripheral edema  ***  Cardiac Rhythm: Normal Sinus Rhythm    GI  Exam: Abdomen soft, Non-tender, Non-distended.    Wound:       Extremities  Exam: Extremities warm, pink, well-perfused.      Derm:  Exam: Good skin turgor, no skin breakdown.        LABS  --------------------------------------------------------------------------------------  Labs:  CAPILLARY BLOOD GLUCOSE                              15.9   9.83  )-----------( 213      ( 03 May 2018 07:31 )             45.7       Auto Neutrophil %: 89.0 % (05-03-18 @ 07:31)  Auto Immature Granulocyte %: 0.2 % (05-03-18 @ 07:31)    05-03    138  |  103  |  13  ----------------------------<  166<H>  4.3   |  22  |  0.9      Calcium, Total Serum: 9.4 mg/dL (05-03-18 @ 07:31)  Magnesium, Serum: 2.0 mg/dL (05-03-18 @ 07:31)      LFTs:             7.7  | 0.6  | 37       ------------------[137     ( 03 May 2018 07:31 )  4.6  | x    | 58          Lipase:10     Amylase:x         Lactate, Blood: 1.6 mmol/L (05-03-18 @ 07:31)      Coags:     11.90  ----< 1.10    ( 03 May 2018 07:31 )     31.5                  --------------------------------------------------------------------------------------    OTHER LABS    IMAGING RESULTS  < from: CT Abdomen and Pelvis w/ Oral Cont and w/ IV Cont (05.03.18 @ 09:42) >  There are multiple dilated loops of small   bowel which are fluid-filled with fecalization consistent with small   bowel obstruction. The point of transition appears to be adjacent to the   ventral hernia mesh on image 23 of series 3. There is no intraperitoneal   free air. The appendix appears unremarkable.    Findings consistent with small bowel obstruction with transition point at   a ventral hernia mesh repair    Indeterminate right renal lesion measuring 1.3 cm, stable in size.             ASSESSMENT/ PLAN:  57y Male ABD PAIN r/o bowel obstruction         Attending aware and agrees with plan

## 2018-05-04 LAB
ANION GAP SERPL CALC-SCNC: 12 MMOL/L — SIGNIFICANT CHANGE UP (ref 7–14)
APTT BLD: 31.4 SEC — SIGNIFICANT CHANGE UP (ref 27–39.2)
BASOPHILS # BLD AUTO: 0.06 K/UL — SIGNIFICANT CHANGE UP (ref 0–0.2)
BASOPHILS NFR BLD AUTO: 1 % — SIGNIFICANT CHANGE UP (ref 0–1)
BUN SERPL-MCNC: 11 MG/DL — SIGNIFICANT CHANGE UP (ref 10–20)
CALCIUM SERPL-MCNC: 8.4 MG/DL — LOW (ref 8.5–10.1)
CHLORIDE SERPL-SCNC: 105 MMOL/L — SIGNIFICANT CHANGE UP (ref 98–110)
CO2 SERPL-SCNC: 24 MMOL/L — SIGNIFICANT CHANGE UP (ref 17–32)
CREAT SERPL-MCNC: 0.7 MG/DL — SIGNIFICANT CHANGE UP (ref 0.7–1.5)
EOSINOPHIL # BLD AUTO: 0 K/UL — SIGNIFICANT CHANGE UP (ref 0–0.7)
EOSINOPHIL NFR BLD AUTO: 0 % — SIGNIFICANT CHANGE UP (ref 0–8)
GLUCOSE SERPL-MCNC: 86 MG/DL — SIGNIFICANT CHANGE UP (ref 70–99)
HCT VFR BLD CALC: 38.4 % — LOW (ref 42–52)
HGB BLD-MCNC: 13.3 G/DL — LOW (ref 14–18)
IMM GRANULOCYTES NFR BLD AUTO: 0 % — LOW (ref 0.1–0.3)
INR BLD: 1.19 RATIO — SIGNIFICANT CHANGE UP (ref 0.65–1.3)
LYMPHOCYTES # BLD AUTO: 1.14 K/UL — LOW (ref 1.2–3.4)
LYMPHOCYTES # BLD AUTO: 19.1 % — LOW (ref 20.5–51.1)
MAGNESIUM SERPL-MCNC: 1.9 MG/DL — SIGNIFICANT CHANGE UP (ref 1.8–2.4)
MCHC RBC-ENTMCNC: 34.1 PG — HIGH (ref 27–31)
MCHC RBC-ENTMCNC: 34.6 G/DL — SIGNIFICANT CHANGE UP (ref 32–37)
MCV RBC AUTO: 98.5 FL — HIGH (ref 80–94)
MONOCYTES # BLD AUTO: 0.41 K/UL — SIGNIFICANT CHANGE UP (ref 0.1–0.6)
MONOCYTES NFR BLD AUTO: 6.9 % — SIGNIFICANT CHANGE UP (ref 1.7–9.3)
NEUTROPHILS # BLD AUTO: 4.37 K/UL — SIGNIFICANT CHANGE UP (ref 1.4–6.5)
NEUTROPHILS NFR BLD AUTO: 73 % — SIGNIFICANT CHANGE UP (ref 42.2–75.2)
PHOSPHATE SERPL-MCNC: 3.2 MG/DL — SIGNIFICANT CHANGE UP (ref 2.1–4.9)
PLATELET # BLD AUTO: 145 K/UL — SIGNIFICANT CHANGE UP (ref 130–400)
POTASSIUM SERPL-MCNC: 4.5 MMOL/L — SIGNIFICANT CHANGE UP (ref 3.5–5)
POTASSIUM SERPL-SCNC: 4.5 MMOL/L — SIGNIFICANT CHANGE UP (ref 3.5–5)
PROTHROM AB SERPL-ACNC: 12.9 SEC — HIGH (ref 9.95–12.87)
RBC # BLD: 3.9 M/UL — LOW (ref 4.7–6.1)
RBC # FLD: 13.4 % — SIGNIFICANT CHANGE UP (ref 11.5–14.5)
SODIUM SERPL-SCNC: 141 MMOL/L — SIGNIFICANT CHANGE UP (ref 135–146)
WBC # BLD: 5.98 K/UL — SIGNIFICANT CHANGE UP (ref 4.8–10.8)
WBC # FLD AUTO: 5.98 K/UL — SIGNIFICANT CHANGE UP (ref 4.8–10.8)

## 2018-05-04 RX ORDER — BENZOCAINE 10 %
1 GEL (GRAM) MUCOUS MEMBRANE ONCE
Qty: 0 | Refills: 0 | Status: DISCONTINUED | OUTPATIENT
Start: 2018-05-04 | End: 2018-05-06

## 2018-05-04 RX ADMIN — MORPHINE SULFATE 2 MILLIGRAM(S): 50 CAPSULE, EXTENDED RELEASE ORAL at 15:08

## 2018-05-04 RX ADMIN — MORPHINE SULFATE 2 MILLIGRAM(S): 50 CAPSULE, EXTENDED RELEASE ORAL at 01:48

## 2018-05-04 RX ADMIN — MORPHINE SULFATE 2 MILLIGRAM(S): 50 CAPSULE, EXTENDED RELEASE ORAL at 10:53

## 2018-05-04 RX ADMIN — MORPHINE SULFATE 2 MILLIGRAM(S): 50 CAPSULE, EXTENDED RELEASE ORAL at 06:16

## 2018-05-04 RX ADMIN — MORPHINE SULFATE 2 MILLIGRAM(S): 50 CAPSULE, EXTENDED RELEASE ORAL at 21:19

## 2018-05-04 RX ADMIN — MORPHINE SULFATE 2 MILLIGRAM(S): 50 CAPSULE, EXTENDED RELEASE ORAL at 06:18

## 2018-05-04 RX ADMIN — HEPARIN SODIUM 5000 UNIT(S): 5000 INJECTION INTRAVENOUS; SUBCUTANEOUS at 05:49

## 2018-05-04 RX ADMIN — HEPARIN SODIUM 5000 UNIT(S): 5000 INJECTION INTRAVENOUS; SUBCUTANEOUS at 13:38

## 2018-05-04 RX ADMIN — MORPHINE SULFATE 2 MILLIGRAM(S): 50 CAPSULE, EXTENDED RELEASE ORAL at 21:17

## 2018-05-04 RX ADMIN — SODIUM CHLORIDE 100 MILLILITER(S): 9 INJECTION INTRAMUSCULAR; INTRAVENOUS; SUBCUTANEOUS at 12:17

## 2018-05-04 RX ADMIN — SODIUM CHLORIDE 100 MILLILITER(S): 9 INJECTION INTRAMUSCULAR; INTRAVENOUS; SUBCUTANEOUS at 15:27

## 2018-05-04 RX ADMIN — SODIUM CHLORIDE 100 MILLILITER(S): 9 INJECTION INTRAMUSCULAR; INTRAVENOUS; SUBCUTANEOUS at 05:20

## 2018-05-04 RX ADMIN — PANTOPRAZOLE SODIUM 40 MILLIGRAM(S): 20 TABLET, DELAYED RELEASE ORAL at 12:59

## 2018-05-04 RX ADMIN — MORPHINE SULFATE 2 MILLIGRAM(S): 50 CAPSULE, EXTENDED RELEASE ORAL at 15:22

## 2018-05-04 RX ADMIN — SODIUM CHLORIDE 100 MILLILITER(S): 9 INJECTION INTRAMUSCULAR; INTRAVENOUS; SUBCUTANEOUS at 17:22

## 2018-05-04 RX ADMIN — HEPARIN SODIUM 5000 UNIT(S): 5000 INJECTION INTRAVENOUS; SUBCUTANEOUS at 21:19

## 2018-05-04 RX ADMIN — MORPHINE SULFATE 2 MILLIGRAM(S): 50 CAPSULE, EXTENDED RELEASE ORAL at 01:50

## 2018-05-04 RX ADMIN — MORPHINE SULFATE 2 MILLIGRAM(S): 50 CAPSULE, EXTENDED RELEASE ORAL at 10:33

## 2018-05-04 NOTE — PROVIDER CONTACT NOTE (OTHER) - SITUATION
md made aware that pt pulled out NG tube, md stated he will come up when her has time to see the pt.

## 2018-05-04 NOTE — CHART NOTE - NSCHARTNOTEFT_GEN_A_CORE
Patient pulled out his ng tube, replaced it and cxr ordered  still not passing gas or bowel movements

## 2018-05-05 LAB
ANION GAP SERPL CALC-SCNC: 13 MMOL/L — SIGNIFICANT CHANGE UP (ref 7–14)
BASOPHILS # BLD AUTO: 0.05 K/UL — SIGNIFICANT CHANGE UP (ref 0–0.2)
BASOPHILS NFR BLD AUTO: 1.1 % — HIGH (ref 0–1)
BUN SERPL-MCNC: 10 MG/DL — SIGNIFICANT CHANGE UP (ref 10–20)
CALCIUM SERPL-MCNC: 8.4 MG/DL — LOW (ref 8.5–10.1)
CHLORIDE SERPL-SCNC: 108 MMOL/L — SIGNIFICANT CHANGE UP (ref 98–110)
CO2 SERPL-SCNC: 22 MMOL/L — SIGNIFICANT CHANGE UP (ref 17–32)
CREAT SERPL-MCNC: 0.7 MG/DL — SIGNIFICANT CHANGE UP (ref 0.7–1.5)
EOSINOPHIL # BLD AUTO: 0 K/UL — SIGNIFICANT CHANGE UP (ref 0–0.7)
EOSINOPHIL NFR BLD AUTO: 0 % — SIGNIFICANT CHANGE UP (ref 0–8)
GLUCOSE SERPL-MCNC: 78 MG/DL — SIGNIFICANT CHANGE UP (ref 70–99)
HCT VFR BLD CALC: 36.2 % — LOW (ref 42–52)
HGB BLD-MCNC: 12.9 G/DL — LOW (ref 14–18)
IMM GRANULOCYTES NFR BLD AUTO: 0.2 % — SIGNIFICANT CHANGE UP (ref 0.1–0.3)
LYMPHOCYTES # BLD AUTO: 1.19 K/UL — LOW (ref 1.2–3.4)
LYMPHOCYTES # BLD AUTO: 25.8 % — SIGNIFICANT CHANGE UP (ref 20.5–51.1)
MAGNESIUM SERPL-MCNC: 1.7 MG/DL — LOW (ref 1.8–2.4)
MCHC RBC-ENTMCNC: 34.3 PG — HIGH (ref 27–31)
MCHC RBC-ENTMCNC: 35.6 G/DL — SIGNIFICANT CHANGE UP (ref 32–37)
MCV RBC AUTO: 96.3 FL — HIGH (ref 80–94)
MONOCYTES # BLD AUTO: 0.38 K/UL — SIGNIFICANT CHANGE UP (ref 0.1–0.6)
MONOCYTES NFR BLD AUTO: 8.2 % — SIGNIFICANT CHANGE UP (ref 1.7–9.3)
NEUTROPHILS # BLD AUTO: 2.99 K/UL — SIGNIFICANT CHANGE UP (ref 1.4–6.5)
NEUTROPHILS NFR BLD AUTO: 64.7 % — SIGNIFICANT CHANGE UP (ref 42.2–75.2)
NRBC # BLD: 0 /100 WBCS — SIGNIFICANT CHANGE UP (ref 0–0)
PHOSPHATE SERPL-MCNC: 2.8 MG/DL — SIGNIFICANT CHANGE UP (ref 2.1–4.9)
PLATELET # BLD AUTO: 116 K/UL — LOW (ref 130–400)
POTASSIUM SERPL-MCNC: 4 MMOL/L — SIGNIFICANT CHANGE UP (ref 3.5–5)
POTASSIUM SERPL-SCNC: 4 MMOL/L — SIGNIFICANT CHANGE UP (ref 3.5–5)
RBC # BLD: 3.76 M/UL — LOW (ref 4.7–6.1)
RBC # FLD: 13 % — SIGNIFICANT CHANGE UP (ref 11.5–14.5)
SODIUM SERPL-SCNC: 143 MMOL/L — SIGNIFICANT CHANGE UP (ref 135–146)
WBC # BLD: 4.62 K/UL — LOW (ref 4.8–10.8)
WBC # FLD AUTO: 4.62 K/UL — LOW (ref 4.8–10.8)

## 2018-05-05 PROCEDURE — 99233 SBSQ HOSP IP/OBS HIGH 50: CPT

## 2018-05-05 RX ORDER — MAGNESIUM SULFATE 500 MG/ML
1 VIAL (ML) INJECTION ONCE
Qty: 0 | Refills: 0 | Status: COMPLETED | OUTPATIENT
Start: 2018-05-05 | End: 2018-05-05

## 2018-05-05 RX ADMIN — MORPHINE SULFATE 2 MILLIGRAM(S): 50 CAPSULE, EXTENDED RELEASE ORAL at 11:34

## 2018-05-05 RX ADMIN — MORPHINE SULFATE 2 MILLIGRAM(S): 50 CAPSULE, EXTENDED RELEASE ORAL at 06:34

## 2018-05-05 RX ADMIN — MORPHINE SULFATE 2 MILLIGRAM(S): 50 CAPSULE, EXTENDED RELEASE ORAL at 19:59

## 2018-05-05 RX ADMIN — MORPHINE SULFATE 2 MILLIGRAM(S): 50 CAPSULE, EXTENDED RELEASE ORAL at 02:45

## 2018-05-05 RX ADMIN — MORPHINE SULFATE 2 MILLIGRAM(S): 50 CAPSULE, EXTENDED RELEASE ORAL at 15:02

## 2018-05-05 RX ADMIN — SODIUM CHLORIDE 100 MILLILITER(S): 9 INJECTION INTRAMUSCULAR; INTRAVENOUS; SUBCUTANEOUS at 05:47

## 2018-05-05 RX ADMIN — MORPHINE SULFATE 2 MILLIGRAM(S): 50 CAPSULE, EXTENDED RELEASE ORAL at 23:43

## 2018-05-05 RX ADMIN — Medication 100 GRAM(S): at 10:46

## 2018-05-05 RX ADMIN — MORPHINE SULFATE 2 MILLIGRAM(S): 50 CAPSULE, EXTENDED RELEASE ORAL at 16:57

## 2018-05-05 RX ADMIN — HEPARIN SODIUM 5000 UNIT(S): 5000 INJECTION INTRAVENOUS; SUBCUTANEOUS at 15:02

## 2018-05-05 RX ADMIN — HEPARIN SODIUM 5000 UNIT(S): 5000 INJECTION INTRAVENOUS; SUBCUTANEOUS at 21:24

## 2018-05-05 RX ADMIN — MORPHINE SULFATE 2 MILLIGRAM(S): 50 CAPSULE, EXTENDED RELEASE ORAL at 02:08

## 2018-05-05 RX ADMIN — HEPARIN SODIUM 5000 UNIT(S): 5000 INJECTION INTRAVENOUS; SUBCUTANEOUS at 05:47

## 2018-05-05 RX ADMIN — MORPHINE SULFATE 2 MILLIGRAM(S): 50 CAPSULE, EXTENDED RELEASE ORAL at 23:28

## 2018-05-05 RX ADMIN — MORPHINE SULFATE 2 MILLIGRAM(S): 50 CAPSULE, EXTENDED RELEASE ORAL at 11:02

## 2018-05-05 RX ADMIN — MORPHINE SULFATE 2 MILLIGRAM(S): 50 CAPSULE, EXTENDED RELEASE ORAL at 19:29

## 2018-05-05 RX ADMIN — PANTOPRAZOLE SODIUM 40 MILLIGRAM(S): 20 TABLET, DELAYED RELEASE ORAL at 11:38

## 2018-05-05 NOTE — PROVIDER CONTACT NOTE (OTHER) - SITUATION
RUDI Paulino notified that pt states that during rounds this AM doctors said he could have clear liquids today, diet order is still NPO.

## 2018-05-06 ENCOUNTER — TRANSCRIPTION ENCOUNTER (OUTPATIENT)
Age: 58
End: 2018-05-06

## 2018-05-06 VITALS
HEART RATE: 56 BPM | RESPIRATION RATE: 18 BRPM | SYSTOLIC BLOOD PRESSURE: 100 MMHG | DIASTOLIC BLOOD PRESSURE: 65 MMHG | TEMPERATURE: 96 F

## 2018-05-06 LAB
ANION GAP SERPL CALC-SCNC: 12 MMOL/L — SIGNIFICANT CHANGE UP (ref 7–14)
BASOPHILS # BLD AUTO: 0.04 K/UL — SIGNIFICANT CHANGE UP (ref 0–0.2)
BASOPHILS NFR BLD AUTO: 1.1 % — HIGH (ref 0–1)
BUN SERPL-MCNC: 7 MG/DL — LOW (ref 10–20)
CALCIUM SERPL-MCNC: 8.1 MG/DL — LOW (ref 8.5–10.1)
CHLORIDE SERPL-SCNC: 105 MMOL/L — SIGNIFICANT CHANGE UP (ref 98–110)
CO2 SERPL-SCNC: 24 MMOL/L — SIGNIFICANT CHANGE UP (ref 17–32)
CREAT SERPL-MCNC: 0.6 MG/DL — LOW (ref 0.7–1.5)
EOSINOPHIL # BLD AUTO: 0.22 K/UL — SIGNIFICANT CHANGE UP (ref 0–0.7)
EOSINOPHIL NFR BLD AUTO: 6.3 % — SIGNIFICANT CHANGE UP (ref 0–8)
GLUCOSE SERPL-MCNC: 138 MG/DL — HIGH (ref 70–99)
HCT VFR BLD CALC: 33.9 % — LOW (ref 42–52)
HGB BLD-MCNC: 12 G/DL — LOW (ref 14–18)
IMM GRANULOCYTES NFR BLD AUTO: 0.3 % — SIGNIFICANT CHANGE UP (ref 0.1–0.3)
LYMPHOCYTES # BLD AUTO: 1.12 K/UL — LOW (ref 1.2–3.4)
LYMPHOCYTES # BLD AUTO: 31.8 % — SIGNIFICANT CHANGE UP (ref 20.5–51.1)
MAGNESIUM SERPL-MCNC: 1.9 MG/DL — SIGNIFICANT CHANGE UP (ref 1.8–2.4)
MCHC RBC-ENTMCNC: 33.6 PG — HIGH (ref 27–31)
MCHC RBC-ENTMCNC: 35.4 G/DL — SIGNIFICANT CHANGE UP (ref 32–37)
MCV RBC AUTO: 95 FL — HIGH (ref 80–94)
MONOCYTES # BLD AUTO: 0.39 K/UL — SIGNIFICANT CHANGE UP (ref 0.1–0.6)
MONOCYTES NFR BLD AUTO: 11.1 % — HIGH (ref 1.7–9.3)
NEUTROPHILS # BLD AUTO: 1.74 K/UL — SIGNIFICANT CHANGE UP (ref 1.4–6.5)
NEUTROPHILS NFR BLD AUTO: 49.4 % — SIGNIFICANT CHANGE UP (ref 42.2–75.2)
NRBC # BLD: 0 /100 WBCS — SIGNIFICANT CHANGE UP (ref 0–0)
PHOSPHATE SERPL-MCNC: 2.9 MG/DL — SIGNIFICANT CHANGE UP (ref 2.1–4.9)
PLATELET # BLD AUTO: 107 K/UL — LOW (ref 130–400)
POTASSIUM SERPL-MCNC: 3.5 MMOL/L — SIGNIFICANT CHANGE UP (ref 3.5–5)
POTASSIUM SERPL-SCNC: 3.5 MMOL/L — SIGNIFICANT CHANGE UP (ref 3.5–5)
RBC # BLD: 3.57 M/UL — LOW (ref 4.7–6.1)
RBC # FLD: 13.1 % — SIGNIFICANT CHANGE UP (ref 11.5–14.5)
SODIUM SERPL-SCNC: 141 MMOL/L — SIGNIFICANT CHANGE UP (ref 135–146)
WBC # BLD: 3.52 K/UL — LOW (ref 4.8–10.8)
WBC # FLD AUTO: 3.52 K/UL — LOW (ref 4.8–10.8)

## 2018-05-06 PROCEDURE — 99233 SBSQ HOSP IP/OBS HIGH 50: CPT

## 2018-05-06 RX ORDER — PANTOPRAZOLE SODIUM 20 MG/1
40 TABLET, DELAYED RELEASE ORAL
Qty: 0 | Refills: 0 | Status: DISCONTINUED | OUTPATIENT
Start: 2018-05-06 | End: 2018-05-06

## 2018-05-06 RX ORDER — ACETAMINOPHEN 500 MG
650 TABLET ORAL EVERY 6 HOURS
Qty: 0 | Refills: 0 | Status: DISCONTINUED | OUTPATIENT
Start: 2018-05-06 | End: 2018-05-06

## 2018-05-06 RX ORDER — POTASSIUM CHLORIDE 20 MEQ
40 PACKET (EA) ORAL ONCE
Qty: 0 | Refills: 0 | Status: COMPLETED | OUTPATIENT
Start: 2018-05-06 | End: 2018-05-06

## 2018-05-06 RX ADMIN — MORPHINE SULFATE 2 MILLIGRAM(S): 50 CAPSULE, EXTENDED RELEASE ORAL at 08:27

## 2018-05-06 RX ADMIN — Medication 40 MILLIEQUIVALENT(S): at 11:09

## 2018-05-06 RX ADMIN — HEPARIN SODIUM 5000 UNIT(S): 5000 INJECTION INTRAVENOUS; SUBCUTANEOUS at 05:22

## 2018-05-06 RX ADMIN — MORPHINE SULFATE 2 MILLIGRAM(S): 50 CAPSULE, EXTENDED RELEASE ORAL at 04:10

## 2018-05-06 RX ADMIN — PANTOPRAZOLE SODIUM 40 MILLIGRAM(S): 20 TABLET, DELAYED RELEASE ORAL at 11:09

## 2018-05-06 RX ADMIN — MORPHINE SULFATE 2 MILLIGRAM(S): 50 CAPSULE, EXTENDED RELEASE ORAL at 04:25

## 2018-05-06 NOTE — DISCHARGE NOTE ADULT - PATIENT PORTAL LINK FT
You can access the [facility] Patient Portal, offered by [facility], by registering with the following website: [email or web address]

## 2018-05-06 NOTE — DISCHARGE NOTE ADULT - CARE PLAN
Principal Discharge DX:	SBO (small bowel obstruction)  Goal:	complete recovery  Assessment and plan of treatment:	Continue regular diet,  Follow up with Dr. Ross in his clinic as an outpatient. Please call to schedule an appointment in 1-2 weeks.

## 2018-05-06 NOTE — DISCHARGE NOTE ADULT - HOSPITAL COURSE
57yoM with surghx: ex lap silvestre patch complicated by incisional hernia repair w/ mesh , alcoholism, pancreatitis who p/w generalized abdominal pain   since yesterday, severe, states entire abdomen and nonradiating. Last BM was yesterday evening and feels constipated and not passing gas. Associated nausea and decreased urination. Denies vomiting, fever. During his hospital course, the patient had an NG tube that was discontinued after return of bowel function, his diet was advanced and tolerated. Will be discharged today with a plan to follow up with Dr. Ross as an outpatient.

## 2018-05-06 NOTE — PROGRESS NOTE ADULT - SUBJECTIVE AND OBJECTIVE BOX
GENERAL SURGERY Progress Note    Patient is a 57y old  Male who presents with a chief complaint of sbo (03 May 2018 12:36)    SBO partial with extensive multiple abdominal surgeries, on non operative management      PAST MEDICAL & SURGICAL HISTORY:  Hernia  Pancreatitis  Bowel obstruction  Anxiety  ex lap silvestre patch   incisional hernia repair  Home Meds: Home Medications:  SEROquel:  (03 May 2018 07:44)  Xanax 1 mg oral tablet:  (03 May 2018 07:44)    Allergies: Allergies    No Known Allergies    Intolerances      Soc:   Advanced Directives: Presumed Full Code     CURRENT MEDICATIONS:     --------------------------------------------------------------------------------------    VITAL SIGNS, INS/OUTS (last 24 hours):  --------------------------------------------------------------------------------------  ICU Vital Signs Last 24 Hrs  T(C): 36.6 (03 May 2018 08:15), Max: 36.6 (03 May 2018 08:15)  T(F): 97.8 (03 May 2018 08:15), Max: 97.8 (03 May 2018 08:15)  HR: 62 (03 May 2018 08:15) (62 - 66)  BP: 114/73 (03 May 2018 08:15) (114/73 - 131/80)  BP(mean): --  ABP: --  ABP(mean): --  RR: 18 (03 May 2018 08:15) (18 - 18)  SpO2: 96% (03 May 2018 08:15) (96% - 98%)    I&O's Summary    --------------------------------------------------------------------------------------    EXAM:  General/Neuro  GCS: 15  Exam: Normal, NAD, alert, oriented x 3, no focal deficits. PERRLA     Respiratory  Exam: Lungs clear to auscultation, Normal expansion/effort.     Cardiovascular  Exam: S1, S2.  Regular rate and rhythm.    Cardiac Rhythm: Normal Sinus Rhythm    GI  Exam: Abdomen soft,  mild tender diffuse, Non-distended.    NG tube to suction  Extremities  Exam: Extremities warm, pink, well-perfused.      Derm:  Exam: Good skin turgor, no skin breakdown.        LABS  --------------------------------------------------------------------------------------  Labs:  CAPILLARY BLOOD GLUCOSE                              15.9   9.83  )-----------( 213      ( 03 May 2018 07:31 )             45.7       Auto Neutrophil %: 89.0 % (05-03-18 @ 07:31)  Auto Immature Granulocyte %: 0.2 % (05-03-18 @ 07:31)    05-03    138  |  103  |  13  ----------------------------<  166<H>  4.3   |  22  |  0.9      Calcium, Total Serum: 9.4 mg/dL (05-03-18 @ 07:31)  Magnesium, Serum: 2.0 mg/dL (05-03-18 @ 07:31)      LFTs:             7.7  | 0.6  | 37       ------------------[137     ( 03 May 2018 07:31 )  4.6  | x    | 58          Lipase:10     Amylase:x         Lactate, Blood: 1.6 mmol/L (05-03-18 @ 07:31)      Coags:     11.90  ----< 1.10    ( 03 May 2018 07:31 )     31.5                  --------------------------------------------------------------------------------------        ASSESSMENT/ PLAN:  57y Male ABD PAIN small bowel obstruction   - admit npo iv fluids pain control ng tube for decompression (03 May 2018 12:36)    PAST MEDICAL & SURGICAL HISTORY:  Hernia  Pancreatitis  Bowel obstruction  Anxiety  No significant past surgical history    MEDICATIONS  (STANDING):  heparin  Injectable 5000 Unit(s) SubCutaneous every 8 hours  pantoprazole  Injectable 40 milliGRAM(s) IV Push daily  sodium chloride 0.9%. 1000 milliLiter(s) (100 mL/Hr) IV Continuous <Continuous>    MEDICATIONS  (PRN):  morphine  - Injectable 2 milliGRAM(s) IV Push every 4 hours PRN Severe Pain      I&O's Detail    03 May 2018 07:01  -  04 May 2018 01:39  --------------------------------------------------------  IN:  Total IN: 0 mL    OUT:    Nasoenteral Tube: 250 mL  Total OUT: 250 mL    Total NET: -250 mL            Vital Signs Last 24 Hrs  T(C): 36.6 (04 May 2018 00:00), Max: 36.6 (03 May 2018 08:15)  T(F): 97.8 (04 May 2018 00:00), Max: 97.8 (03 May 2018 08:15)  HR: 54 (04 May 2018 00:00) (54 - 66)  BP: 116/70 (04 May 2018 00:00) (111/68 - 131/80)  BP(mean): --  RR: 18 (04 May 2018 00:00) (18 - 66)  SpO2: 96% (03 May 2018 08:15) (96% - 98%)  Physical Exam:  General: no pallor   HEENT: no icterus  Lungs: b/l clear  Heart: s1/s2 normal  Abd: soft, scars appreciated, non tender  Neuro: conscious, oriented  Extremities: b/l palpable pulses  Skin: moist    LABS:    CBC Full  -  ( 03 May 2018 07:31 )  WBC Count : 9.83 K/uL  Hemoglobin : 15.9 g/dL  Hematocrit : 45.7 %  Platelet Count - Automated : 213 K/uL  Mean Cell Volume : 96.0 fL  Mean Cell Hemoglobin : 33.4 pg  Mean Cell Hemoglobin Concentration : 34.8 g/dL  Auto Neutrophil # : 8.75 K/uL  Auto Lymphocyte # : 0.70 K/uL  Auto Monocyte # : 0.30 K/uL  Auto Eosinophil # : 0.00 K/uL  Auto Basophil # : 0.06 K/uL  Auto Neutrophil % : 89.0 %  Auto Lymphocyte % : 7.1 %  Auto Monocyte % : 3.1 %  Auto Eosinophil % : 0.0 %  Auto Basophil % : 0.6 %    05-03    138  |  103  |  13  ----------------------------<  166<H>  4.3   |  22  |  0.9    Ca    9.4      03 May 2018 07:31  Mg     2.0     05-03    TPro  7.7  /  Alb  4.6  /  TBili  0.6  /  DBili  x   /  AST  37  /  ALT  58<H>  /  AlkPhos  137<H>  05-03    PT/INR - ( 03 May 2018 07:31 )   PT: 11.90 sec;   INR: 1.10 ratio         PTT - ( 03 May 2018 07:31 )  PTT:31.5 sec  Urinalysis Basic - ( 03 May 2018 08:10 )    Color: Yellow / Appearance: Clear / SG: >=1.030 / pH: x  Gluc: x / Ketone: Negative  / Bili: Negative / Urobili: 0.2   Blood: x / Protein: Trace / Nitrite: Negative   Leuk Esterase: Negative / RBC: x / WBC x   Sq Epi: x / Non Sq Epi: x / Bacteria: Few /HPF          LIVER FUNCTIONS - ( 03 May 2018 07:31 )  Alb: 4.6 g/dL / Pro: 7.7 g/dL / ALK PHOS: 137 U/L / ALT: 58 U/L / AST: 37 U/L / GGT: x           OTHER LABS    IMAGING RESULTS  < from: CT Abdomen and Pelvis w/ Oral Cont and w/ IV Cont (05.03.18 @ 09:42) >  There are multiple dilated loops of small   bowel which are fluid-filled with fecalization consistent with small   bowel obstruction. The point of transition appears to be adjacent to the   ventral hernia mesh on image 23 of series 3. There is no intraperitoneal   free air. The appendix appears unremarkable.    Findings consistent with small bowel obstruction with transition point at   a ventral hernia mesh repair    Indeterminate right renal lesion measuring 1.3 cm, stable in size.   RADIOLOGY & ADDITIONAL STUDIES:
Progress Note: Trauma Surgery  Patient: VALERIA FRIEDMAN , 57y (1960)Male   MRN: [id number]  Location: [address]  Visit: 05-03-18 Inpatient  Date: 05-06-18 @ 03:52  Hospital Day: 3    Procedure/Injury: Partial SBO  Events over 24h: Patient with no acute complaints, tolerated diet, ambulated, no pain, no nausea, +flatus, -BM, NG tube removed    Vitals: T(F): 97.9 (05-05-18 @ 23:43), Max: 97.9 (05-05-18 @ 23:43)  HR: 52 (05-05-18 @ 23:43)  BP: 100/68 (05-05-18 @ 23:43) (100/68 - 122/74)  RR: 18 (05-05-18 @ 23:43)  SpO2: --    In:   05-05-18 @ 07:01  -  05-06-18 @ 03:52  --------------------------------------------------------  IN: 1840 mL    Out:   05-05-18 @ 07:01  -  05-06-18 @ 03:52  --------------------------------------------------------  OUT:    Voided: 550 mL  Total OUT: 550 mL    Net:   05-05-18 @ 07:01  -  05-06-18 @ 03:52  --------------------------------------------------------  NET: 1290 mL    Diet: Diet, Regular (05-05-18 @ 17:23)    Physical Examination:  General: NAD, AAOx3  HEENT: NCAT, MILAN, WNL  Heart: S1 S2, No MRG RRR   Lungs: CTABL +BS Equal BL, No WRC  Abdomen:  +BS. SNDNT.  Skin: Warm/dry, Normal color    Medications: [Standing]  benzocaine 20% Spray 1 Spray(s) Topical once  heparin  Injectable 5000 Unit(s) SubCutaneous every 8 hours  pantoprazole  Injectable 40 milliGRAM(s) IV Push daily    Medications:[PRN]  morphine  - Injectable 2 milliGRAM(s) IV Push every 4 hours PRN    Labs:                        12.0   3.52  )-----------( 107      ( 06 May 2018 01:58 )             33.9     05-06    141  |  105  |  7<L>  ----------------------------<  138<H>  3.5   |  24  |  0.6<L>    Ca    8.1<L>      06 May 2018 01:58  Phos  2.9     05-06  Mg     1.9     05-06    Imaging:  None/24h
VASCULAR SURGERY PROGRESS NOTE    Hospital Day #2d      Events of past 24 hours:    Pt reported passing gas, has had minimal NGT output, he has ambulated. Denied fever, chills, nausea, vomiting    Diet: npo    I&O's Detail    04 May 2018 07:01  -  05 May 2018 07:00  --------------------------------------------------------  IN:    sodium chloride 0.9%.: 1400 mL  Total IN: 1400 mL    OUT:    Nasoenteral Tube: 155 mL    Voided: 451 mL  Total OUT: 606 mL    Total NET: 794 mL          PHYSICAL EXAM    Vital Signs Last 24 Hrs  T(C): 35.9 (05 May 2018 07:25), Max: 36.9 (04 May 2018 23:42)  T(F): 96.7 (05 May 2018 07:25), Max: 98.4 (04 May 2018 23:42)  HR: 59 (05 May 2018 07:25) (56 - 67)  BP: 121/65 (05 May 2018 07:25) (101/62 - 123/69)  BP(mean): --  RR: 18 (05 May 2018 07:25) (16 - 18)  SpO2: --    Gen: NAD, AAOx3  CV: S1 S2 RRR  Lungs: CTABL  Abd: +BS SOFT NT ND  Incision:   Ext: NO EDEMA NON TENDER    MEDICATIONS  (STANDING):  benzocaine 20% Spray 1 Spray(s) Topical once  heparin  Injectable 5000 Unit(s) SubCutaneous every 8 hours  pantoprazole  Injectable 40 milliGRAM(s) IV Push daily  sodium chloride 0.9%. 1000 milliLiter(s) (100 mL/Hr) IV Continuous <Continuous>    MEDICATIONS  (PRN):  morphine  - Injectable 2 milliGRAM(s) IV Push every 4 hours PRN Severe Pain        LAB/STUDIES:                        12.9   4.62  )-----------( 116      ( 05 May 2018 01:40 )             36.2     05-05    143  |  108  |  10  ----------------------------<  78  4.0   |  22  |  0.7    Ca    8.4<L>      05 May 2018 01:40  Phos  2.8     05-05  Mg     1.7     05-05      PT/INR - ( 04 May 2018 01:51 )   PT: 12.90 sec;   INR: 1.19 ratio         PTT - ( 04 May 2018 01:51 )  PTT:31.4 sec

## 2018-05-06 NOTE — DISCHARGE NOTE ADULT - CARE PROVIDER_API CALL
Yevgeniy Ross), Surgery  [address] [place] [address]  [address] [place] [address]  Phone: [phone]  Fax: [phone]

## 2018-05-06 NOTE — DISCHARGE NOTE ADULT - MEDICATION SUMMARY - MEDICATIONS TO TAKE
I will START or STAY ON the medications listed below when I get home from the hospital:    SEROquel  -- Indication: For Anxiety    Xanax 1 mg oral tablet  -- Indication: For Anxiety

## 2018-05-06 NOTE — DISCHARGE NOTE ADULT - PLAN OF CARE
complete recovery Continue regular diet,  Follow up with Dr. Ross in his clinic as an outpatient. Please call to schedule an appointment in 1-2 weeks.

## 2018-05-06 NOTE — PROGRESS NOTE ADULT - ASSESSMENT
Assessment:  57y Male patient admitted with Partial SBO, Patient with no acute complaints, tolerated diet, ambulated, no pain, no nausea, +flatus, -BM , NG tube removed, with the above physical exam, labs, and imaging findings.    Plan:  Advanced diet  Patient w/o complaints overnight  plan for D/C home in AM    Date/Time: 05-06-18 @ 03:52
Plan to monitor input outputs, continue NG tube to suction, watch and correct electrolytes, not passing flatus or bowel movements at present, watch for bowel function
-d/c NGT, OOB, clears

## 2018-05-17 DIAGNOSIS — K56.600 PARTIAL INTESTINAL OBSTRUCTION, UNSPECIFIED AS TO CAUSE: ICD-10-CM

## 2018-05-17 DIAGNOSIS — F41.9 ANXIETY DISORDER, UNSPECIFIED: ICD-10-CM

## 2018-05-17 DIAGNOSIS — Z98.890 OTHER SPECIFIED POSTPROCEDURAL STATES: ICD-10-CM

## 2018-05-17 DIAGNOSIS — R73.9 HYPERGLYCEMIA, UNSPECIFIED: ICD-10-CM

## 2018-11-19 PROBLEM — K56.609 UNSPECIFIED INTESTINAL OBSTRUCTION, UNSPECIFIED AS TO PARTIAL VERSUS COMPLETE OBSTRUCTION: Chronic | Status: ACTIVE | Noted: 2018-05-03

## 2018-11-19 PROBLEM — K46.9 UNSPECIFIED ABDOMINAL HERNIA WITHOUT OBSTRUCTION OR GANGRENE: Chronic | Status: ACTIVE | Noted: 2018-05-03

## 2018-11-19 PROBLEM — K85.90 ACUTE PANCREATITIS WITHOUT NECROSIS OR INFECTION, UNSPECIFIED: Chronic | Status: ACTIVE | Noted: 2018-05-03

## 2018-11-19 PROBLEM — F41.9 ANXIETY DISORDER, UNSPECIFIED: Chronic | Status: ACTIVE | Noted: 2018-05-03

## 2018-11-23 ENCOUNTER — OUTPATIENT (OUTPATIENT)
Dept: OUTPATIENT SERVICES | Facility: HOSPITAL | Age: 58
LOS: 1 days | Discharge: HOME | End: 2018-11-23

## 2018-11-23 VITALS
WEIGHT: 198.42 LBS | HEART RATE: 59 BPM | DIASTOLIC BLOOD PRESSURE: 84 MMHG | HEIGHT: 74 IN | OXYGEN SATURATION: 97 % | TEMPERATURE: 98 F | SYSTOLIC BLOOD PRESSURE: 138 MMHG | RESPIRATION RATE: 17 BRPM

## 2018-11-23 DIAGNOSIS — Z01.818 ENCOUNTER FOR OTHER PREPROCEDURAL EXAMINATION: ICD-10-CM

## 2018-11-23 DIAGNOSIS — Z12.11 ENCOUNTER FOR SCREENING FOR MALIGNANT NEOPLASM OF COLON: Chronic | ICD-10-CM

## 2018-11-23 DIAGNOSIS — M75.81 OTHER SHOULDER LESIONS, RIGHT SHOULDER: ICD-10-CM

## 2018-11-23 DIAGNOSIS — Z87.19 PERSONAL HISTORY OF OTHER DISEASES OF THE DIGESTIVE SYSTEM: Chronic | ICD-10-CM

## 2018-11-23 LAB
ALBUMIN SERPL ELPH-MCNC: 4.7 G/DL — SIGNIFICANT CHANGE UP (ref 3.5–5.2)
ALP SERPL-CCNC: 147 U/L — HIGH (ref 30–115)
ALT FLD-CCNC: 112 U/L — HIGH (ref 0–41)
ANION GAP SERPL CALC-SCNC: 16 MMOL/L — HIGH (ref 7–14)
AST SERPL-CCNC: 92 U/L — HIGH (ref 0–41)
BASOPHILS # BLD AUTO: 0.09 K/UL — SIGNIFICANT CHANGE UP (ref 0–0.2)
BASOPHILS NFR BLD AUTO: 1.2 % — HIGH (ref 0–1)
BILIRUB SERPL-MCNC: 0.8 MG/DL — SIGNIFICANT CHANGE UP (ref 0.2–1.2)
BUN SERPL-MCNC: 9 MG/DL — LOW (ref 10–20)
CALCIUM SERPL-MCNC: 9.4 MG/DL — SIGNIFICANT CHANGE UP (ref 8.5–10.1)
CHLORIDE SERPL-SCNC: 98 MMOL/L — SIGNIFICANT CHANGE UP (ref 98–110)
CO2 SERPL-SCNC: 27 MMOL/L — SIGNIFICANT CHANGE UP (ref 17–32)
CREAT SERPL-MCNC: 0.7 MG/DL — SIGNIFICANT CHANGE UP (ref 0.7–1.5)
EOSINOPHIL # BLD AUTO: 0.01 K/UL — SIGNIFICANT CHANGE UP (ref 0–0.7)
EOSINOPHIL NFR BLD AUTO: 0.1 % — SIGNIFICANT CHANGE UP (ref 0–8)
GLUCOSE SERPL-MCNC: 70 MG/DL — SIGNIFICANT CHANGE UP (ref 70–99)
HCT VFR BLD CALC: 42.1 % — SIGNIFICANT CHANGE UP (ref 42–52)
HGB BLD-MCNC: 14.7 G/DL — SIGNIFICANT CHANGE UP (ref 14–18)
IMM GRANULOCYTES NFR BLD AUTO: 0.3 % — SIGNIFICANT CHANGE UP (ref 0.1–0.3)
LYMPHOCYTES # BLD AUTO: 2.05 K/UL — SIGNIFICANT CHANGE UP (ref 1.2–3.4)
LYMPHOCYTES # BLD AUTO: 26.5 % — SIGNIFICANT CHANGE UP (ref 20.5–51.1)
MCHC RBC-ENTMCNC: 34.8 PG — HIGH (ref 27–31)
MCHC RBC-ENTMCNC: 34.9 G/DL — SIGNIFICANT CHANGE UP (ref 32–37)
MCV RBC AUTO: 99.8 FL — HIGH (ref 80–94)
MONOCYTES # BLD AUTO: 0.58 K/UL — SIGNIFICANT CHANGE UP (ref 0.1–0.6)
MONOCYTES NFR BLD AUTO: 7.5 % — SIGNIFICANT CHANGE UP (ref 1.7–9.3)
NEUTROPHILS # BLD AUTO: 4.99 K/UL — SIGNIFICANT CHANGE UP (ref 1.4–6.5)
NEUTROPHILS NFR BLD AUTO: 64.4 % — SIGNIFICANT CHANGE UP (ref 42.2–75.2)
NRBC # BLD: 0 /100 WBCS — SIGNIFICANT CHANGE UP (ref 0–0)
PLATELET # BLD AUTO: 150 K/UL — SIGNIFICANT CHANGE UP (ref 130–400)
POTASSIUM SERPL-MCNC: 4.4 MMOL/L — SIGNIFICANT CHANGE UP (ref 3.5–5)
POTASSIUM SERPL-SCNC: 4.4 MMOL/L — SIGNIFICANT CHANGE UP (ref 3.5–5)
PROT SERPL-MCNC: 7.7 G/DL — SIGNIFICANT CHANGE UP (ref 6–8)
RBC # BLD: 4.22 M/UL — LOW (ref 4.7–6.1)
RBC # FLD: 13.2 % — SIGNIFICANT CHANGE UP (ref 11.5–14.5)
SODIUM SERPL-SCNC: 141 MMOL/L — SIGNIFICANT CHANGE UP (ref 135–146)
WBC # BLD: 7.74 K/UL — SIGNIFICANT CHANGE UP (ref 4.8–10.8)
WBC # FLD AUTO: 7.74 K/UL — SIGNIFICANT CHANGE UP (ref 4.8–10.8)

## 2018-11-23 RX ORDER — ALPRAZOLAM 0.25 MG
0 TABLET ORAL
Qty: 0 | Refills: 0 | COMMUNITY

## 2018-11-23 NOTE — H&P PST ADULT - HISTORY OF PRESENT ILLNESS
58 year old female here for right shoulder surgical arthroscopy, pt injured his right shoulder approx 1 year ago at home, pt has tried physical therapy without relief, needs procedure  pt denies cp, sob, cabrera or palpitations  pt denies urinary frequency, urgency or burning  ex khloe 2 fos

## 2018-11-23 NOTE — H&P PST ADULT - MALLAMPATI CLASS
Class II - visualization of the soft palate, fauces, and uvula able to hyperextend neck 30 degrees/Class II - visualization of the soft palate, fauces, and uvula

## 2018-11-23 NOTE — H&P PST ADULT - FAMILY HISTORY
No pertinent family history in first degree relatives Father  Still living? No  Family history of prostate cancer in father, Age at diagnosis: 61-70

## 2018-12-07 ENCOUNTER — RESULT REVIEW (OUTPATIENT)
Age: 58
End: 2018-12-07

## 2018-12-07 ENCOUNTER — OUTPATIENT (OUTPATIENT)
Dept: OUTPATIENT SERVICES | Facility: HOSPITAL | Age: 58
LOS: 1 days | Discharge: HOME | End: 2018-12-07

## 2018-12-07 VITALS
SYSTOLIC BLOOD PRESSURE: 106 MMHG | HEART RATE: 55 BPM | OXYGEN SATURATION: 99 % | DIASTOLIC BLOOD PRESSURE: 68 MMHG | RESPIRATION RATE: 18 BRPM

## 2018-12-07 VITALS
RESPIRATION RATE: 18 BRPM | DIASTOLIC BLOOD PRESSURE: 57 MMHG | SYSTOLIC BLOOD PRESSURE: 93 MMHG | HEART RATE: 48 BPM | TEMPERATURE: 98 F | OXYGEN SATURATION: 99 % | HEIGHT: 74 IN | WEIGHT: 198.42 LBS

## 2018-12-07 DIAGNOSIS — Z87.19 PERSONAL HISTORY OF OTHER DISEASES OF THE DIGESTIVE SYSTEM: Chronic | ICD-10-CM

## 2018-12-07 DIAGNOSIS — Z12.11 ENCOUNTER FOR SCREENING FOR MALIGNANT NEOPLASM OF COLON: Chronic | ICD-10-CM

## 2018-12-07 RX ORDER — MORPHINE SULFATE 50 MG/1
2 CAPSULE, EXTENDED RELEASE ORAL
Qty: 0 | Refills: 0 | Status: DISCONTINUED | OUTPATIENT
Start: 2018-12-07 | End: 2018-12-07

## 2018-12-07 RX ORDER — SODIUM CHLORIDE 9 MG/ML
1000 INJECTION, SOLUTION INTRAVENOUS
Qty: 0 | Refills: 0 | Status: DISCONTINUED | OUTPATIENT
Start: 2018-12-07 | End: 2018-12-22

## 2018-12-07 RX ORDER — HYDROMORPHONE HYDROCHLORIDE 2 MG/ML
0.5 INJECTION INTRAMUSCULAR; INTRAVENOUS; SUBCUTANEOUS
Qty: 0 | Refills: 0 | Status: DISCONTINUED | OUTPATIENT
Start: 2018-12-07 | End: 2018-12-07

## 2018-12-07 RX ORDER — ONDANSETRON 8 MG/1
4 TABLET, FILM COATED ORAL ONCE
Qty: 0 | Refills: 0 | Status: DISCONTINUED | OUTPATIENT
Start: 2018-12-07 | End: 2018-12-22

## 2018-12-07 RX ORDER — OXYCODONE AND ACETAMINOPHEN 5; 325 MG/1; MG/1
1 TABLET ORAL ONCE
Qty: 0 | Refills: 0 | Status: DISCONTINUED | OUTPATIENT
Start: 2018-12-07 | End: 2018-12-07

## 2018-12-07 RX ADMIN — SODIUM CHLORIDE 100 MILLILITER(S): 9 INJECTION, SOLUTION INTRAVENOUS at 09:36

## 2018-12-07 RX ADMIN — HYDROMORPHONE HYDROCHLORIDE 0.5 MILLIGRAM(S): 2 INJECTION INTRAMUSCULAR; INTRAVENOUS; SUBCUTANEOUS at 10:00

## 2018-12-07 NOTE — CHART NOTE - NSCHARTNOTEFT_GEN_A_CORE
PACU ANESTHESIA PACU ADMISSION NOTE      Procedure:Subacromial decompression with fixation of os acromiale of right shoulder    Post op diagnosisSubacromial impingement of right shoulder      ____ Intubated  TV:______       Rate: ______      FiO2: ______    ___x_ Patent Airway    ___x_ Full return of protective reflexes    ____ Full recovery from anesthesia / sedation to baseline status    Viitals:  see anesthesia record            Mental Status:  __x__ Awake   _____ Alert   _____ Drowsy   _____ Sedated    Nausea/Vomiting: ____ Yes, See Post - Op Orders      ___x_ No    Pain Scale (0-10): _____    Treatment: ____ None    __x__ See Post - Op/PCA Orders    Post - Operative Fluids:   ____ Oral   __x__ See Post - Op Orders    Plan:         Discharge:   x___Home       _____Floor         _____Critical Care    _____Other:_________________    Comments:  uneventful perioperative course

## 2018-12-07 NOTE — ASU DISCHARGE PLAN (ADULT/PEDIATRIC). - PAIN
prescription given to patient/guardian/prescription given by MD/percocet 5/325  one tablet every 4-6 hours as necessary for pain

## 2018-12-07 NOTE — BRIEF OPERATIVE NOTE - PROCEDURE
<<-----Click on this checkbox to enter Procedure Subacromial decompression with fixation of os acromiale of right shoulder  12/07/2018    Active  AELDIB

## 2018-12-11 LAB — SURGICAL PATHOLOGY STUDY: SIGNIFICANT CHANGE UP

## 2018-12-12 DIAGNOSIS — M25.811 OTHER SPECIFIED JOINT DISORDERS, RIGHT SHOULDER: ICD-10-CM

## 2018-12-12 DIAGNOSIS — M75.81 OTHER SHOULDER LESIONS, RIGHT SHOULDER: ICD-10-CM

## 2018-12-12 DIAGNOSIS — M75.51 BURSITIS OF RIGHT SHOULDER: ICD-10-CM

## 2019-10-14 NOTE — PRE-ANESTHESIA EVALUATION ADULT - NSANTHAPLANRD_GEN_ALL_CORE
I have personally seen and examined this patient.  I have fully participated in the care of this patient. I have reviewed all pertinent clinical information, including history, physical exam, plan and the Resident’s note and agree except as noted.
general

## 2020-05-01 ENCOUNTER — OUTPATIENT (OUTPATIENT)
Dept: OUTPATIENT SERVICES | Facility: HOSPITAL | Age: 60
LOS: 1 days | End: 2020-05-01
Payer: MEDICAID

## 2020-05-01 DIAGNOSIS — Z12.11 ENCOUNTER FOR SCREENING FOR MALIGNANT NEOPLASM OF COLON: Chronic | ICD-10-CM

## 2020-05-01 DIAGNOSIS — Z87.19 PERSONAL HISTORY OF OTHER DISEASES OF THE DIGESTIVE SYSTEM: Chronic | ICD-10-CM

## 2020-05-01 PROCEDURE — G9001: CPT

## 2020-05-16 ENCOUNTER — EMERGENCY (EMERGENCY)
Facility: HOSPITAL | Age: 60
LOS: 0 days | Discharge: HOME | End: 2020-05-16
Attending: EMERGENCY MEDICINE | Admitting: EMERGENCY MEDICINE
Payer: MEDICAID

## 2020-05-16 VITALS
HEART RATE: 81 BPM | OXYGEN SATURATION: 98 % | TEMPERATURE: 98 F | DIASTOLIC BLOOD PRESSURE: 82 MMHG | SYSTOLIC BLOOD PRESSURE: 131 MMHG | RESPIRATION RATE: 18 BRPM

## 2020-05-16 VITALS
OXYGEN SATURATION: 96 % | TEMPERATURE: 98 F | DIASTOLIC BLOOD PRESSURE: 83 MMHG | RESPIRATION RATE: 19 BRPM | SYSTOLIC BLOOD PRESSURE: 125 MMHG | HEART RATE: 91 BPM
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
## 2020-05-16 DIAGNOSIS — Z87.19 PERSONAL HISTORY OF OTHER DISEASES OF THE DIGESTIVE SYSTEM: Chronic | ICD-10-CM

## 2020-05-16 DIAGNOSIS — S80.212A ABRASION, LEFT KNEE, INITIAL ENCOUNTER: ICD-10-CM
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
## 2020-05-16 DIAGNOSIS — F10.920 ALCOHOL USE, UNSPECIFIED WITH INTOXICATION, UNCOMPLICATED: ICD-10-CM

## 2020-05-16 DIAGNOSIS — Z23 ENCOUNTER FOR IMMUNIZATION: ICD-10-CM

## 2020-05-16 DIAGNOSIS — F10.120 ALCOHOL ABUSE WITH INTOXICATION, UNCOMPLICATED: ICD-10-CM

## 2020-05-16 DIAGNOSIS — Z12.11 ENCOUNTER FOR SCREENING FOR MALIGNANT NEOPLASM OF COLON: Chronic | ICD-10-CM

## 2020-05-16 DIAGNOSIS — S09.90XA UNSPECIFIED INJURY OF HEAD, INITIAL ENCOUNTER: ICD-10-CM

## 2020-05-16 DIAGNOSIS — S80.211A ABRASION, RIGHT KNEE, INITIAL ENCOUNTER: ICD-10-CM

## 2020-05-16 PROCEDURE — 99285 EMERGENCY DEPT VISIT HI MDM: CPT

## 2020-05-16 PROCEDURE — 72125 CT NECK SPINE W/O DYE: CPT | Mod: 26

## 2020-05-16 PROCEDURE — 70450 CT HEAD/BRAIN W/O DYE: CPT | Mod: 26

## 2020-05-16 PROCEDURE — 73560 X-RAY EXAM OF KNEE 1 OR 2: CPT | Mod: 26,LT,RT

## 2020-05-16 RX ORDER — TETANUS TOXOID, REDUCED DIPHTHERIA TOXOID AND ACELLULAR PERTUSSIS VACCINE, ADSORBED 5; 2.5; 8; 8; 2.5 [IU]/.5ML; [IU]/.5ML; UG/.5ML; UG/.5ML; UG/.5ML
0.5 SUSPENSION INTRAMUSCULAR ONCE
Refills: 0 | Status: COMPLETED | OUTPATIENT
Start: 2020-05-16 | End: 2020-05-16

## 2020-05-16 RX ADMIN — TETANUS TOXOID, REDUCED DIPHTHERIA TOXOID AND ACELLULAR PERTUSSIS VACCINE, ADSORBED 0.5 MILLILITER(S): 5; 2.5; 8; 8; 2.5 SUSPENSION INTRAMUSCULAR at 13:13

## 2020-05-16 NOTE — ED PROVIDER NOTE - PROGRESS NOTE DETAILS
TC: 58 yo M with PMHx of anxiety, bowel obstruction, umbilical hernia, pancreatitis who was BIBEMS for etoh intox and taking xanax x3. Here in ED, vitals wnl. Pt has abrasions to knees and chin but unable to provide hx 2/2 intox. Placed on 1:1 for fall risk. FS 97. Ordered CT head/c-spine. Will monitor. TC: 58 yo M with PMHx of anxiety, bowel obstruction, umbilical hernia, pancreatitis who was BIBEMS for etoh intox and taking xanax x3. Here in ED, vitals wnl. Pt has abrasions to knees and chin but unable to provide hx 2/2 intox. Placed on 1:1 for fall risk. FS 97. Ordered CT head/c-spine as cannot r/o injury given intoxication. Updated tdap. Will monitor. TC: Spoke with Torrie (partner) who stated that she last saw him at 10am, then came home at noon and pt wasn't at home. States that he took 2-3 xanax 1mg at 7:30am. Recently started drinking alcohol again but wasn't drinking before she left home. She reports he did not verbalize any HI or SI recently. TC: 60 yo M with PMHx of anxiety, bowel obstruction, umbilical hernia, pancreatitis who was BIBEMS for etoh intox and taking xanax x3. Here in ED, vitals wnl. Pt has abrasions to knees and chin but unable to provide hx 2/2 intox. Placed on 1:1 for fall risk. FS 97. Ordered CT head/c-spine as cannot r/o injury given intoxication. No prior hx of HI or SI per partner. Updated tdap. Will monitor. TC: CT head/c-spine negative. No acute fx or dislocation on xrays. Pt tolerated po in ED. Partner states she will come  pt.

## 2020-05-16 NOTE — ED PROVIDER NOTE - PHYSICAL EXAMINATION
CONSTITUTIONAL: well developed, well nourished, no acute distress, +etoh to breath  TRAUMA: ABC intact, GCS 15  HEAD: normocephalic, abrasion to R chin  EYES: PERRL at 5 mm, EOMI, no raccoon eyes  ENT: no nasal discharge, no septal hematoma, no gillette sign, moist mucous membranes, no mandibular instability, no mandibular malalignment  NECK: cervical collar in place, no midline tenderness, no stepoffs, no deformity  CV: regular rate and rhythm, equal distal pulses  RESP: lungs clear to auscultation bilaterally, normal work of breathing, symmetric rise and fall of chest   CHEST: no chest wall tenderness, no crepitus, no clavicular deformity/tenting  ABD: soft, nondistended, nontender, no rebound, no guarding, no rigidity, no pelvic instability  BACK: no midline T/L/S-spine tenderness, no stepoffs, no deformity  EXT: no obvious deformity, no tenderness of extremities, full ROM, cap refill < 2 seconds  SKIN: no rashes, no lacerations, abrasions to bilateral knees and R side of chin  NEURO: awake, moves all extremities, GCS 15  PSYCH: intoxicated, uncooperative CONSTITUTIONAL: well developed, well nourished, no acute distress, +etoh to breath  TRAUMA: ABC intact, GCS 15  HEAD: normocephalic, abrasion to R chin  EYES: PERRL at 5 mm, EOMI, no raccoon eyes  ENT: no nasal discharge, no septal hematoma, no gillette sign, moist mucous membranes, no mandibular instability, no mandibular malalignment  NECK: cervical collar in place, no midline tenderness, no stepoffs, no deformity  CV: regular rate and rhythm, equal distal pulses  RESP: lungs clear to auscultation bilaterally, normal work of breathing, symmetric rise and fall of chest   CHEST: no chest wall tenderness, no crepitus, no clavicular deformity/tenting  ABD: soft, nondistended, nontender, no rebound, no guarding, no rigidity, no pelvic instability, soft reducible umbilical hernia  BACK: no midline T/L/S-spine tenderness, no stepoffs, no deformity  EXT: no obvious deformity, no tenderness of extremities, full ROM, cap refill < 2 seconds  SKIN: no rashes, no lacerations, abrasions to bilateral knees and R side of chin  NEURO: awake, moves all extremities, GCS 15  PSYCH: intoxicated, uncooperative CONSTITUTIONAL: well developed, well nourished, no acute distress  TRAUMA: ABC intact, GCS 15  HEAD: normocephalic, abrasion to R chin  EYES: PERRL at 5 mm, EOMI, no raccoon eyes  ENT: no nasal discharge, no septal hematoma, no gillette sign, moist mucous membranes, no mandibular instability, no mandibular malalignment  NECK: cervical collar in place, no midline tenderness, no stepoffs, no deformity  CV: regular rate and rhythm, equal distal pulses  RESP: lungs clear to auscultation bilaterally, normal work of breathing, symmetric rise and fall of chest   CHEST: no chest wall tenderness, no crepitus, no clavicular deformity/tenting  ABD: soft, nondistended, nontender, no rebound, no guarding, no rigidity, no pelvic instability, soft reducible umbilical hernia  BACK: no midline T/L/S-spine tenderness, no stepoffs, no deformity  EXT: no obvious deformity, no tenderness of extremities, full ROM, cap refill < 2 seconds  SKIN: no rashes, no lacerations, abrasions to bilateral knees and R side of chin  NEURO: awake, moves all extremities, GCS 15  PSYCH: intoxicated, uncooperative, intoxicated

## 2020-05-16 NOTE — ED ADULT NURSE NOTE - NSIMPLEMENTINTERV_GEN_ALL_ED
Implemented All Fall with Harm Risk Interventions:  [place] to call system. Call bell, personal items and telephone within reach. Instruct patient to call for assistance. Room bathroom lighting operational. Non-slip footwear when patient is off stretcher. Physically safe environment: no spills, clutter or unnecessary equipment. Stretcher in lowest position, wheels locked, appropriate side rails in place. Provide visual cue, wrist band, yellow gown, etc. Monitor gait and stability. Monitor for mental status changes and reorient to person, place, and time. Review medications for side effects contributing to fall risk. Reinforce activity limits and safety measures with patient and family. Provide visual clues: red socks.

## 2020-05-16 NOTE — ED PROVIDER NOTE - OBJECTIVE STATEMENT
58 yo M with PMHx of anxiety, bowel obstruction, umbilical hernia, pancreatitis who was BIBEMS for intoxication. Per EMS, pt admitted to taking 3 xanax and drinking etoh. Per pt, states that he takes xanax 1mg BID and took 2 today but unable to state when or why. Pt keeps trying to get out of bed with ataxic gait and yelling "where is my xanax?" Denies any other ingestion but +etoh to breath. Also with abrasions to bilateral knees and chin but pt denies fall. Last tdap unknown. Further hx limited 2/2 intoxication. 58 yo M with PMHx of anxiety, bowel obstruction, umbilical hernia, pancreatitis who was BIBEMS for intoxication. Per partner Torrie ([phone]), states pt takes xanax 1mg BID but took 2-3 today at 7:30am. Pt recently started drinking alcohol again because of stress. He has not verbalized any HI or SI recently. Per pt, admits to taking 2 xanax and drinking etoh today. Denies any other ingestion. Here in ED, pt keeps trying to get out of bed with ataxic gait and yelling "where is my xanax?" Also with abrasions to bilateral knees and chin but pt denies fall. Last tdap unknown. Further hx limited 2/2 intoxication. 60 yo M with PMHx of anxiety, bowel obstruction, umbilical hernia, pancreatitis who was BIBEMS for intoxication. Per partner Torrie ([phone]), states pt takes xanax 1mg BID but took 2-3 today at 7:30am. Pt recently started drinking alcohol again because of stress. Has no hx of HI or SI. Per pt, admits to taking 2 xanax and drinking etoh today. Denies any other ingestion. Here in ED, pt keeps trying to get out of bed with ataxic gait and yelling "where is my xanax?" Also with abrasions to bilateral knees and chin but pt denies fall. Last tdap unknown. Further hx limited 2/2 intoxication.

## 2020-05-16 NOTE — ED PROVIDER NOTE - CLINICAL SUMMARY MEDICAL DECISION MAKING FREE TEXT BOX
CT neg for fx or intracranial injury. 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 Pt is more sober now but becoming agitated, wants to leave. Spoke with girlfriend, will pick him up.

## 2020-05-16 NOTE — ED PROVIDER NOTE - PATIENT PORTAL LINK FT
You can access the FollowMyHealth Patient Portal offered by [facility] by registering at the following website: [email or web address]. By joining [facility]’s FollowMyHealth portal, you will also be able to view your health information using other applications (apps) compatible with our system.

## 2020-05-16 NOTE — ED PROVIDER NOTE - CARE PLAN
Principal Discharge DX:	Alcohol intoxication  Secondary Diagnosis:	Abrasions of multiple sites  Secondary Diagnosis:	CHI (closed head injury)

## 2020-05-16 NOTE — ED ADULT NURSE NOTE - OBJECTIVE STATEMENT
59 yr old male came in for etoh intoxication, s/p fall after etoh and 3 xanax. pt has b/l knee abrasions, chin, arm and right eye abrasions.

## 2020-05-16 NOTE — ED PROVIDER NOTE - ATTENDING CONTRIBUTION TO CARE
60yo man BIBEMS intoxicated; pt took alprazolam at home and then reports drinking heavily afterward. He fell and sustained abrasions to knees and chin. No LOC. VS, exam as noted, pt intox but alert, verbal but only marginally cooperative. Given abrasions, c-collar was placed, will do CT head/neck and XR knee, observe to sobriety prior to d/c.

## 2020-06-02 DIAGNOSIS — Z71.89 OTHER SPECIFIED COUNSELING: ICD-10-CM

## 2020-07-02 ENCOUNTER — EMERGENCY (EMERGENCY)
Facility: HOSPITAL | Age: 60
LOS: 0 days | Discharge: HOME | End: 2020-07-02
Attending: EMERGENCY MEDICINE | Admitting: EMERGENCY MEDICINE
Payer: MEDICAID

## 2020-07-02 VITALS
OXYGEN SATURATION: 96 % | WEIGHT: 184.97 LBS | TEMPERATURE: 99 F | SYSTOLIC BLOOD PRESSURE: 114 MMHG | HEART RATE: 79 BPM | DIASTOLIC BLOOD PRESSURE: 69 MMHG | RESPIRATION RATE: 16 BRPM

## 2020-07-02 DIAGNOSIS — S01.01XA LACERATION WITHOUT FOREIGN BODY OF SCALP, INITIAL ENCOUNTER: ICD-10-CM
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
## 2020-07-02 DIAGNOSIS — F17.200 NICOTINE DEPENDENCE, UNSPECIFIED, UNCOMPLICATED: ICD-10-CM
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
## 2020-07-02 DIAGNOSIS — Z12.11 ENCOUNTER FOR SCREENING FOR MALIGNANT NEOPLASM OF COLON: Chronic | ICD-10-CM
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
## 2020-07-02 DIAGNOSIS — Z79.899 OTHER LONG TERM (CURRENT) DRUG THERAPY: ICD-10-CM

## 2020-07-02 DIAGNOSIS — S09.90XA UNSPECIFIED INJURY OF HEAD, INITIAL ENCOUNTER: ICD-10-CM
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
## 2020-07-02 DIAGNOSIS — Z87.19 PERSONAL HISTORY OF OTHER DISEASES OF THE DIGESTIVE SYSTEM: Chronic | ICD-10-CM

## 2020-07-02 PROCEDURE — 70450 CT HEAD/BRAIN W/O DYE: CPT | Mod: 26

## 2020-07-02 PROCEDURE — 99285 EMERGENCY DEPT VISIT HI MDM: CPT | Mod: 25

## 2020-07-02 PROCEDURE — 72125 CT NECK SPINE W/O DYE: CPT | Mod: 26

## 2020-07-02 PROCEDURE — 12002 RPR S/N/AX/GEN/TRNK2.6-7.5CM: CPT

## 2020-07-02 NOTE — ED PROVIDER NOTE - OBJECTIVE STATEMENT
59 year old male w hx of etoh abuse presents to the ED for evaluation of constant, mild, non-radiating pain to laceration to his posterior scalp sustained just prior to arrival. Pt states he was doing laundry, slipped on a carpet and fell backward, hitting his head on hardwood floors. Does admit to drinking a few beers tonight. Denies LOC. Remembers everything before/during/after. Has been ambulatory since. Denies headaches, vision changes, neck pain/stiffness, chest pain, shortness of breath, back pain, abd pain, n/v, dizziness, lightheadedness, behavioral/mental status change, paresthesias, numbness, weakness.

## 2020-07-02 NOTE — ED PROVIDER NOTE - NS ED ROS FT
EYES: (-) vision changes, (-) blurry vision, (-) double vision  ENT: (-) tinnitus, (-) epistaxis, (-) tooth pain, (-) facial swelling  NECK: (-) neck pain, (-) neck stiffness  CARDIO: (-) chest pain, (-) palpitations  PULM: (-) cough, (-) sputum, (-) chest tightness, (-) shortness of breath  GI: (-) nausea, (-) vomiting, (-) abdominal pain  : (-) hematuria, (-) incontinence  HEME: (-) easy bruising, (-) easy bleeding  MSK: (-) back pain, (-) gait difficulty, (-) joint pain, (-) deformity, (-) joint swelling  SKIN: see HPI, (-) ecchymosis  NEURO: see HPI, (-) headache, (-) LOC, (-) dizziness, (-) lightheadedness, (-) weakness, (-) paresthesias, (-) numbness, (-) syncope, (-) speech changes, (-) facial droop, (-) confusion, (-) seizures  PSYCH: (-) suicidal ideation, (-) homicidal ideation    *all other systems negative except as documented above and in the HPI*

## 2020-07-02 NOTE — ED PROVIDER NOTE - NSFOLLOWUPINSTRUCTIONS_ED_ALL_ED_FT
PLEASE RETURN IN 5 DAYS FOR STAPLE REMOVAL    Closed Head Injury    A closed head injury is an injury to your head that may or may not involve a traumatic brain injury (TBI).  A CT scan of the head may not have been performed because they are usually normal after a concussion. Concussions are diagnosed and managed based on the history given and the symptoms experienced after the head injury. Most concussions do not cause serious problem and get better over several days.  Symptoms of TBI can be short or long lasting and include headache, dizziness, interference with memory or speech, fatigue, confusion, changes in sleep, mood changes, nausea, depression/anxiety, and dulling of senses. Make sure to obtain proper rest which includes getting plenty of sleep, avoiding excessive visual stimulation, and avoiding activities that may cause physical or mental stress. Avoid any situation where there is potential for another head injury, including sports.    SEEK IMMEDIATE MEDICAL CARE IF YOU HAVE ANY OF THE FOLLOWING SYMPTOMS: unusual drowsiness, vomiting, severe dizziness, seizures, lightheadedness, muscular weakness, different pupil sizes, visual changes, or clear or bloody discharge from your ears or nose.    Laceration    A laceration is a cut that goes through all of the layers of the skin and into the tissue that is right under the skin. Some lacerations heal on their own. Others need to be closed with skin adhesive strips, skin glue, stitches (sutures), or staples. Proper laceration care minimizes the risk of infection and helps the laceration to heal better.  If non-absorbable stitches or staples have been placed, they must be taken out within the time frame instructed by your healthcare provider.    SEEK IMMEDIATE MEDICAL CARE IF YOU HAVE ANY OF THE FOLLOWING SYMPTOMS: swelling around the wound, worsening pain, drainage from the wound, red streaking going away from your wound, inability to move finger or toe near the laceration, or discoloration of skin near the laceration.

## 2020-07-02 NOTE — ED PROCEDURE NOTE - CPROC ED POST PROC CARE GUIDE1
Instructed patient/caregiver to follow-up with primary care physician./Keep the cast/splint/dressing clean and dry./Instructed patient/caregiver regarding signs and symptoms of infection./Verbal/written post procedure instructions were given to patient/caregiver.
Keep the cast/splint/dressing clean and dry./Instructed patient/caregiver to follow-up with primary care physician./Instructed patient/caregiver regarding signs and symptoms of infection./Verbal/written post procedure instructions were given to patient/caregiver.

## 2020-07-02 NOTE — ED PROVIDER NOTE - ATTENDING CONTRIBUTION TO CARE
sp fall, sts lost balance, ht back of head on floor. no loc. no anticoag. admits to drinking this afternoon.   no vis or speech changes. no cp or sob. no ab pain, n, v.  pt in nad, lac to posterior scalp. no active bleeding. no fb. neck nt, spine nt, supple, ctab, rrr, ab soft, nt, nd.   gait steady, aaox4. speech clear.   will get ct head, repair wound.

## 2020-07-02 NOTE — ED PROVIDER NOTE - PATIENT PORTAL LINK FT
You can access the FollowMyHealth Patient Portal offered by [facility] by registering at the following website: [email or web address]. By joining [facility]’s FollowMyHealth portal, you will also be able to view your health information using other applications (apps) compatible with our system.

## 2020-07-02 NOTE — ED ADULT TRIAGE NOTE - CHIEF COMPLAINT QUOTE
Pt s/p fall backwards, denies dizziness or syncope. Hit his head on the stair, has 1" laceration behind head. Denies anticoagulants. No LOC. Neurologically stable.

## 2020-07-02 NOTE — ED PROCEDURE NOTE - CPROC ED TIME OUT STATEMENT1
“Patient's name, , procedure and correct site were confirmed during the [place] Timeout.”
“Patient's name, , procedure and correct site were confirmed during the [place] Timeout.”

## 2020-07-02 NOTE — ED PROVIDER NOTE - PHYSICAL EXAMINATION
VITALS:  I have reviewed the initial vital signs.  GENERAL: Well-developed, well-nourished, in no acute distress.  HEENT: 4 cm linear lac to posterior midline parietal scalp. 0.5 cm linear lac just lateral to larger lac. no active bleeding. no subcutaneous structures visualized. No gillette sign, raccoon eyes, or hemotympanum. Sclera clear. EOMI, PERRLA.  NECK: supple w FROM. No paraspinal muscle ttp. No midline cervical spinous tenderness, step offs, or deformity.  CARDIO: RRR, nl S1 and S2. No murmurs, rubs, or gallops.   PULM: Normal effort. CTA b/l without wheezes, rales, or rhonchi. Good air entry b/l.  MSK: FROM to extremities x4 w/o swelling/erythema/ecchymosis/deformity/ttp. No midline thoracic or lumbar spinous tenderness, step offs, or deformity. Normal steady gait.  GI: Abdomen soft and non-distended.   SKIN: Warm, dry.   NEURO: A&Ox3. Speech clear. CN II-XII intact. 5/5 strength to upper and lower extremities b/l. Sensation intact and equal throughout. Normal finger to nose.  PSYCH: Calm, cooperative.

## 2020-07-02 NOTE — ED ADULT NURSE NOTE - OBJECTIVE STATEMENT
Pt presents to ED c/o fall. Pt stated pt hit back of head. LOC -. Lac to back of head. Pt is awake alert and not in any distress. Pt denies n/v, HA, dizziness.

## 2021-03-19 ENCOUNTER — OUTPATIENT (OUTPATIENT)
Dept: OUTPATIENT SERVICES | Facility: HOSPITAL | Age: 61
LOS: 1 days | Discharge: HOME | End: 2021-03-19

## 2021-03-19 DIAGNOSIS — Z87.19 PERSONAL HISTORY OF OTHER DISEASES OF THE DIGESTIVE SYSTEM: Chronic | ICD-10-CM

## 2021-03-19 DIAGNOSIS — Z12.11 ENCOUNTER FOR SCREENING FOR MALIGNANT NEOPLASM OF COLON: Chronic | ICD-10-CM

## 2021-10-12 ENCOUNTER — NON-APPOINTMENT (OUTPATIENT)
Age: 61
End: 2021-10-12

## 2021-11-24 ENCOUNTER — NON-APPOINTMENT (OUTPATIENT)
Age: 61
End: 2021-11-24

## 2021-11-30 ENCOUNTER — NON-APPOINTMENT (OUTPATIENT)
Age: 61
End: 2021-11-30

## 2021-11-30 ENCOUNTER — APPOINTMENT (OUTPATIENT)
Dept: SURGERY | Facility: CLINIC | Age: 61
End: 2021-11-30
Payer: MEDICAID

## 2021-11-30 VITALS — WEIGHT: 226 LBS | HEIGHT: 74 IN | BODY MASS INDEX: 29 KG/M2

## 2021-11-30 PROCEDURE — 99203 OFFICE O/P NEW LOW 30 MIN: CPT

## 2021-11-30 NOTE — ASSESSMENT
[FreeTextEntry1] : Garett is a pleasant 61-year-old retired gentleman (who is the son of Dr. Angel, a long-time surgeon on [place] who has since passed away) with a past medical history significant for a perforated ulcer in 2012 who underwent surgery by Dr. Jain with a generous midline upper abdominal incision and was in the hospital for more than a month suffered from sepsis and acute respiratory failure. A 2015 he underwent a repair of a incisional hernia with mesh by me now presenting back to the office more than 6 years later with concerns about another hernia.\par \par Physical examination demonstrates a tender strawberry size bulge at the superior aspect of his generous midline incision likely consistent with a recurrent incisional hernia at the superior aspect of his previous repair which was done with a 4.5 inch intraperitoneal round Ventrio hernia mesh. He also has a periumbilical incisional hernia related to the inferior aspect of his generous midline incision which is very tender but reducible which deserves concomitant repair. There is some asymmetry along the right abdomen inferior to his transverse hernia repair incision which may or may not also represent a recurrence and this will be evaluated intraoperatively. He does have a moderate diastasis recti which is likely related to his excess abdominal weight. He has gained a little weight over the years and his current BMI is 29.\par \par I explained the pros and cons of surgery, as well as all risks, benefits, indications and alternatives of the procedure and the patient understood and agreed. Garett was scheduled for the repair of his recurrent incisional hernia with mesh and repair of his periumbilical incisional hernia with mesh on Wednesday, January 19, 2022 (and sooner if a spot becomes available)  under LOCAL with IV SEDATION at [facility] with presurgical testing waived.  He was encouraged to avoid heavy lifting and strenuous activity in the interim, of course. We also discussed the importance of calorie restriction and healthy eating with regard to weight loss, hernia recurrence and his overall health.

## 2021-11-30 NOTE — PHYSICAL EXAM
[JVD] : no jugular venous distention  [Normal Breath Sounds] : Normal breath sounds [No Rash or Lesion] : No rash or lesion [Alert] : alert [Calm] : calm [de-identified] : overweight [de-identified] : normal [de-identified] : moderately protuberant abdomen, moderate diastasis recti\par  [de-identified] : recurrent incisional hernia, reducible; periumbilical incisional hernia

## 2021-11-30 NOTE — CONSULT LETTER
[Dear  ___] : Dear  [unfilled], [Courtesy Letter:] : I had the pleasure of seeing your patient, [unfilled], in my office today. [Please see my note below.] : Please see my note below. [Consult Closing:] : Thank you very much for allowing me to participate in the care of this patient.  If you have any questions, please do not hesitate to contact me. [FreeTextEntry3] : Respectfully,\par \par Sandro Campbell M.D., FACS\par

## 2022-01-16 ENCOUNTER — LABORATORY RESULT (OUTPATIENT)
Age: 62
End: 2022-01-16

## 2022-01-18 NOTE — ASU PATIENT PROFILE, ADULT - FALL HARM RISK - UNIVERSAL INTERVENTIONS
Bed in lowest position, wheels locked, appropriate side rails in place/Call bell, personal items and telephone in reach/Instruct patient to call for assistance before getting out of bed or chair/Non-slip footwear when patient is out of bed/[place] to call system/Physically safe environment - no spills, clutter or unnecessary equipment/Purposeful Proactive Rounding/Room/bathroom lighting operational, light cord in reach

## 2022-01-19 ENCOUNTER — APPOINTMENT (OUTPATIENT)
Dept: SURGERY | Facility: AMBULATORY SURGERY CENTER | Age: 62
End: 2022-01-19

## 2022-01-19 ENCOUNTER — OUTPATIENT (OUTPATIENT)
Dept: OUTPATIENT SERVICES | Facility: HOSPITAL | Age: 62
LOS: 1 days | Discharge: HOME | End: 2022-01-19

## 2022-01-19 VITALS
SYSTOLIC BLOOD PRESSURE: 113 MMHG | OXYGEN SATURATION: 97 % | RESPIRATION RATE: 15 BRPM | DIASTOLIC BLOOD PRESSURE: 76 MMHG | HEART RATE: 61 BPM

## 2022-01-19 VITALS
TEMPERATURE: 97 F | SYSTOLIC BLOOD PRESSURE: 105 MMHG | HEIGHT: 74 IN | DIASTOLIC BLOOD PRESSURE: 66 MMHG | OXYGEN SATURATION: 98 % | WEIGHT: 225.97 LBS | RESPIRATION RATE: 18 BRPM | HEART RATE: 64 BPM

## 2022-01-19 DIAGNOSIS — Z12.11 ENCOUNTER FOR SCREENING FOR MALIGNANT NEOPLASM OF COLON: Chronic | ICD-10-CM

## 2022-01-19 DIAGNOSIS — Z87.19 PERSONAL HISTORY OF OTHER DISEASES OF THE DIGESTIVE SYSTEM: Chronic | ICD-10-CM

## 2022-01-19 PROCEDURE — 49568: CPT | Mod: 1L,59

## 2022-01-19 PROCEDURE — 49566: CPT | Mod: 1L

## 2022-01-19 PROCEDURE — 49585: CPT | Mod: 1L

## 2022-01-19 PROCEDURE — 49560: CPT | Mod: 1L,59

## 2022-01-19 RX ORDER — SODIUM CHLORIDE 9 MG/ML
1000 INJECTION, SOLUTION INTRAVENOUS
Refills: 0 | Status: DISCONTINUED | OUTPATIENT
Start: 2022-01-19 | End: 2022-02-02

## 2022-01-19 RX ORDER — MORPHINE SULFATE 50 MG/1
4 CAPSULE, EXTENDED RELEASE ORAL
Refills: 0 | Status: DISCONTINUED | OUTPATIENT
Start: 2022-01-19 | End: 2022-01-19

## 2022-01-19 RX ORDER — QUETIAPINE FUMARATE 200 MG/1
0 TABLET, FILM COATED ORAL
Qty: 0 | Refills: 0 | DISCHARGE

## 2022-01-19 RX ORDER — ONDANSETRON 8 MG/1
4 TABLET, FILM COATED ORAL ONCE
Refills: 0 | Status: DISCONTINUED | OUTPATIENT
Start: 2022-01-19 | End: 2022-02-02

## 2022-01-19 RX ADMIN — SODIUM CHLORIDE 80 MILLILITER(S): 9 INJECTION, SOLUTION INTRAVENOUS at 15:00

## 2022-01-19 NOTE — BRIEF OPERATIVE NOTE - NSICDXBRIEFPREOP_GEN_ALL_CORE_FT
PRE-OP DIAGNOSIS:  Ventral hernia 19-Jan-2022 14:36:40  Sandro Campbell  Incisional hernia 19-Jan-2022 14:36:43  Sandro Campbell  Recurrent incisional hernia with incarceration 19-Jan-2022 14:36:45  Sandro Campbell

## 2022-01-19 NOTE — ASU DISCHARGE PLAN (ADULT/PEDIATRIC) - ASU DC SPECIAL INSTRUCTIONSFT
Diet    Eat light on the day of surgery. Nausea and vomiting can occur after anesthesia,   but usually resolve within 24 hours.  Resume normal diet the following day.      Activity    Rest!  No heavy lifting or strenuous activity.    Medications    Ibuprofen (Advil, Motrin), Naprosyn (Aleve) or Extra-Strength Tylenol for pain.  Oxycodone for severe pain only.  Your prescription was sent electronically to your pharmacy.  Remember, oxycodone is a strong narcotic pain reliever which can cause drowsiness, upset stomach and constipation.  It should always be taken with food.  You can use stool softener (Mineral Oil) or laxative (MiraLax or Dulcolax) if constipated.  An antibiotic is given during surgery.  No antibiotic needed at home.   Resume all previous medications.  Resume blood thinners the day after surgery unless told otherwise.    Wound Care    Leave surgical dressing in place.  May shower (dressing is waterproof.)  No pool, ocean, lake, hot-tub or   bath for 3 weeks. If you were given an abdominal binder, please wear it as much as possible (day & night)   for 2 weeks, but you must remove it for showers.  Ice packs to the area intermittently for several days help   with pain and swelling.  Bruising (“black and blue”) is common.  Treatment is…Ice & Rest.

## 2022-01-19 NOTE — ASU PREOP CHECKLIST - SITE MARKED BY SURGEON
"HPI:  Joel Pineda is a 49 year old male presenting for diabetic eye exam.    Last saw Dr. Hernandez 2/2020 without retinopathy.    Vision has been unchanged. Has some fluctuating blurring throughout the day that he attributes to Lyrica and fatigue. Has had a couple episodes of eye pain/headache and redness that were evaluated but never found to have uveitis. Symptoms resolved on their own. Has been using Flonase for eye itching and irritation, ATs BID each eye that help a little bit    Recently had to stop Enbrel for a few weeks because of getting HBV vaccine and was on abx. Recently restarted and waiting for it to kick back in. Takes famotidine weekly to prevent hives with Enbrel.  Previously on Humira but stopped because of hives    Past Ocular History:  Myopia  Tortuous blood vessels \"kind of curly\" at [facility] on [facility]    PMH:  DM2 - diagnosed end of 2019, not on insulin  Ankylosing spondylitis - on Enbrel weekly; pain medications for spondylitis  Hypothyroidism   Elevated cholesterol  PE's in early 40s - not on any blood thinners now  HTN    SH:  Never smoker. Currently on medical disability, worked as  at [employer]    FH:  No known family history of blindness, glaucoma, macular degeneration  Mother - kyphotic cervical spine and many spine issues but not formally diagnosed with ankylosing spondylitis, ulcerative colitis  Father's uncle - ankylosing spondylitis  Brother - getting workup for recent pulmonary embolism    ASSESSMENT and PLAN:  1. Allergic conjunctivitis of both eyes  - discussed chilled ATs for itching  - discussed cool compresses for itchiness  - olopatadine (PATADAY) 0.2 % ophthalmic solution; Place 1 drop into both eyes daily  Dispense: 2.5 mL; Refill: 3    2. Dry eye  - continue ATs prn -- can increase frequency    3. Myopia of both eyes  - doing well with WRx    4. Type 2 diabetes mellitus with complication, without long-term current use of insulin (H)  - diagnosed end of " 2019  - not on insulin  - not checking BS at home per doctor recommendation; recently switched medications to glipizide and has had some symptomatic episodes of hypoglycemia; does not notice changes in vision with these episodes  - last A1c 6.5 in Feb 2021, increased from 6.0 in Oct 2020 and 6.1 in Aug 2020  - no retinopathy on exam; tortuous blood vessels  - encouraged BS/BP control  - annual DFE    5. Ankylosing spondylitis of sacral region (H)  - no active uveitis and no stigmata of prior intraocular inflammation  - discussed signs/symptoms of uveitis including eye pain, redness, blurred vision, new floaters, photophobia and he understands to call/come in should these develop  - previously intolerant of Humira (hives)  - currently on Enbrel      Follow up in 1 year or sooner PRN        -----------------------------------------------------------------------------------    Attestation:  Complete documentation of historical and exam elements from today's encounter can be found in the full encounter summary report (not reduplicated in this progress note). I personally obtained the chief complaint(s) and history of present illness.  I confirmed and edited as necessary the review of systems, past medical/surgical history, family history, social history, and examination findings as documented by others; and I examined the patient myself. I personally reviewed the relevant tests, images, and reports as documented above.     I formulated and edited as necessary the assessment and plan and discussed the findings and management plan with the patient and family.      Audrey Ferreira MD               yes

## 2022-01-19 NOTE — BRIEF OPERATIVE NOTE - NSICDXBRIEFPOSTOP_GEN_ALL_CORE_FT
POST-OP DIAGNOSIS:  Ventral hernia 19-Jan-2022 14:36:50  Sandro Campbell  Incisional hernia 19-Jan-2022 14:36:51  Sandro Campbell  Recurrent incisional hernia with incarceration 19-Jan-2022 14:36:53  Sandro Campbell

## 2022-01-19 NOTE — ASU DISCHARGE PLAN (ADULT/PEDIATRIC) - NS MD DC FALL RISK RISK
For information on Fall & Injury Prevention, visit: [email or web address] OR  [email or web address] OR  https://www.cdc.gov/steadi/patient.html

## 2022-01-19 NOTE — BRIEF OPERATIVE NOTE - NSICDXBRIEFPROCEDURE_GEN_ALL_CORE_FT
PROCEDURES:  Repair of ventral hernia with mesh 19-Jan-2022 14:37:02  Sandro Campbell  Repair of incisional hernia with mesh 19-Jan-2022 14:37:05  Sandro Campbell  Repair of incarcerated incisional hernia using mesh 19-Jan-2022 14:37:16 RECUURENT (NO MESH) Sandro Campbell

## 2022-01-21 DIAGNOSIS — K42.9 UMBILICAL HERNIA WITHOUT OBSTRUCTION OR GANGRENE: ICD-10-CM

## 2022-01-21 DIAGNOSIS — K43.0 INCISIONAL HERNIA WITH OBSTRUCTION, WITHOUT GANGRENE: ICD-10-CM

## 2022-01-21 DIAGNOSIS — F41.9 ANXIETY DISORDER, UNSPECIFIED: ICD-10-CM

## 2022-01-21 DIAGNOSIS — K43.9 VENTRAL HERNIA WITHOUT OBSTRUCTION OR GANGRENE: ICD-10-CM

## 2022-01-27 ENCOUNTER — APPOINTMENT (OUTPATIENT)
Dept: SURGERY | Facility: CLINIC | Age: 62
End: 2022-01-27
Payer: MEDICAID

## 2022-01-27 DIAGNOSIS — K43.0 INCISIONAL HERNIA WITH OBSTRUCTION, W/OUT GANGRENE: ICD-10-CM

## 2022-01-27 PROCEDURE — 99024 POSTOP FOLLOW-UP VISIT: CPT

## 2022-01-27 NOTE — ASSESSMENT
[FreeTextEntry1] : Garett underwent the repair of his recurrent incarcerated incisional hernia, repair of his ventral hernia with mesh and repair of his periumbilical incisional hernia with mesh on January 19, 2022 under local with IV sedation without any problems or complications. His wound is clean, dry and intact. There is no evidence of erythema, seroma formation or infection. He is tolerating a diet and having normal bowel movements. He denies any significant postoperative pain or discomfort at this time.\par \par He was counseled and reassured. Garett was discharged from the office with no specific followup necessary, but he knows to avoid any heavy lifting or strenuous activity for the next several weeks. We also discussed the importance of calorie restriction and healthy eating with regard to weight loss, hernia recurrence and his overall health. He was encouraged to continue to wear his abdominal binder for the better part of the next month, especially since he was not wearing it today.

## 2022-01-27 NOTE — CONSULT LETTER
[FreeTextEntry1] : Dear Dr. Holland Reynolds, \par \par I had the pleasure of seeing your patient, VALERIA FRIEDMAN, in my office today. Please see my note below. \par \par Thank you very much for allowing me to participate in the care of this patient. If you have any questions, please do not hesitate to contact me. \par \par \par Respectfully,\par \par Sandro Campbell M.D., FACS

## 2022-05-07 NOTE — ASU DISCHARGE PLAN (ADULT/PEDIATRIC) - CARE PROVIDER_API CALL
[facility]  Progress Note    Patient Name: Tianna Leon  MRN: [id number]  Patient Class: IP- Inpatient   Admission Date: 5/6/2022  Length of Stay: 1 days  Attending Physician: Elizabeth Kim MD  Primary Care Provider: Spenser Campa MD        Subjective:     Principal Problem:<principal problem not specified>        HPI:  40 y/o. female  with a PMHx of asthma, COPD,Bipolar  , IVDU and HLD presented to the ER with a c/o  sob for the last weak which has gotten worse . The SOB is associated with fever , chills , productive cough , back pain  and generalized weakness . She report cough some blood this am .  She is active IVDU  ( heroine , cocaine  and amphetamine ) . She denies any  sick contact , chest pain  , GI/ sx , She also complaning of LE sweeling . Knee pain and B/L LE rash .   ER COURSE: CTA chest negative for pe . Multiple nodular and cavitary opacities concerning for septic emboli with larger opacities possibly necrotizing pneumonia. CT cervical  and thoracic did not show any acute finding , CT lumbar Possible progression of degenerative changes most notable at L4-5 with moderate to severe spinal canal stenosis at this level.  WBC  29 k , na 130 , k 3.4 , cl 89 , procal 2.71   ER VS:  BP Pulse Resp Temp SpO2   (!) 124/58 110 (!) 30 (!) 101.6 °F (38.7 °C) 96 %           Pt will be admitted to inpt with a dx of PNA and severe sepsis       Overview/Hospital Course:  5/7 admitted for pneumonia, severe sepsis in setting of ivdu. Mother at bedside and provides collateral. Patient has struggled with substance abuse for approximately 20 years, worsening over last 3-5 years since life event. Onset of symptoms 1-2 weeks ago. Tolerated thoracentesis with no pneumothorax on chest x-ray. Mri lumbar and echo, pending. Bcx positive, growing gram positive cocci. On vanc, cefepime and flagyl. Infectious disease consulted for bacteremia.      Interval History: See hospital course for  today      Review of Systems   Unable to perform ROS: Other (somnolence)   Objective:     Vital Signs (Most Recent):  Temp: 98.1 °F (36.7 °C) (05/07/22 1142)  Pulse: 82 (05/07/22 1142)  Resp: 18 (05/07/22 1142)  BP: 121/70 (05/07/22 1142)  SpO2: 100 % (05/07/22 1142) Vital Signs (24h Range):  Temp:  [97.7 °F (36.5 °C)-101.6 °F (38.7 °C)] 98.1 °F (36.7 °C)  Pulse:  [] 82  Resp:  [17-30] 18  SpO2:  [93 %-100 %] 100 %  BP: ()/(51-70) 121/70     Weight: 83 kg (182 lb 15.7 oz)  Body mass index is 30.45 kg/m².    Intake/Output Summary (Last 24 hours) at 5/7/2022 1255  Last data filed at 5/7/2022 0451  Gross per 24 hour   Intake 1670.01 ml   Output 400 ml   Net 1270.01 ml      Physical Exam  Vitals and nursing note reviewed. Exam conducted with a chaperone present (family).   Constitutional:       General: She is not in acute distress.     Appearance: She is ill-appearing. She is not toxic-appearing.   HENT:      Head: Normocephalic and atraumatic.   Cardiovascular:      Rate and Rhythm: Normal rate.   Pulmonary:      Effort: Pulmonary effort is normal. No respiratory distress.   Abdominal:      Palpations: Abdomen is soft.      Tenderness: There is no abdominal tenderness.   Musculoskeletal:         General: Swelling and tenderness present.      Right lower leg: No edema.      Left lower leg: No edema.   Skin:     General: Skin is warm.      Coloration: Skin is pale.      Findings: Erythema present.   Neurological:      Motor: Weakness present.       Significant Labs: All pertinent labs within the past 24 hours have been reviewed.  Blood Culture:   Recent Labs   Lab 05/06/22  1616 05/06/22  1617   LABBLOO Gram stain aer bottle: Gram positive cocci in clusters resembling Staph   Gram stain missael bottle: Gram positive cocci in clusters resembling Staph   Results called to and read back by: Lila Hernandez RN 05/07/2022  10:57 Gram stain aer bottle: Gram positive cocci in clusters resembling Staph   Results  called to and read back by: Lila Hernandez RN 05/07/2022  10:57     CBC:   Recent Labs   Lab 05/06/22  1616 05/07/22  0623   WBC 29.13* 27.52*   HGB 9.9* 9.0*   HCT 27.8* 26.0*    115*     CMP:   Recent Labs   Lab 05/06/22  1619 05/07/22  0623 05/07/22  0731   * 134*  --    K 3.4* 3.3*  --    CL 89* 95  --    CO2 28 31*  --     111*  --    BUN 46* 38*  --    CREATININE 1.1 0.8  --    CALCIUM 8.3* 7.6*  --    PROT 6.0 4.8* 5.0*   ALBUMIN 1.8* 1.5*  --    BILITOT 1.4* 1.7*  --    ALKPHOS 139* 122  --    AST 37 56*  --    ALT 33 38  --    ANIONGAP 13 8  --    EGFRNONAA >60 >60  --        Significant Imaging: I have reviewed all pertinent imaging results/findings within the past 24 hours.  CT: I have reviewed all pertinent results/findings within the past 24 hours and my personal findings are:  bulging discs  CXR: I have reviewed all pertinent results/findings within the past 24 hours and my personal findings are:  no pneumothorax      Assessment/Plan:      Bacteremia  Gram positive   Infectious disease consulted  Continue broad spectrum intravenous antibiotic(s) given h/o ivdu  Remains febrile and leukocytosis persists      Loculated pleural effusion  Pulmonology consulted  Status post thoracentesis  Fluid studies pending      IVDU (intravenous drug user)   Will ordet TTE to r/o IE  Will order MRI lumbar wwo   Cont broad spectrum IVAB     5/7   Studies pending for additional sites of infection given h/o ivdu    Severe sepsis  This patient does have evidence of infective focus  My overall impression is sepsis. Vital signs were reviewed and noted in progress note.  Antibiotics given-   Antibiotics (From admission, onward)            Start     Stop Route Frequency Ordered    05/07/22 1300  vancomycin 1.25 g in dextrose 5% 250 mL IVPB (ready to mix)         -- IV Once 05/07/22 1213    05/07/22 0200  metronidazole IVPB 500 mg         -- IV Every 8 hours (non-standard times) 05/06/22 1954 05/07/22 0100  " cefepime in dextrose 5 % IVPB 2 g         -- IV Every 8 hours (non-standard times) 05/06/22 1954 05/06/22 2052  vancomycin - pharmacy to dose  (vancomycin IVPB)        "And" Linked Group Details    -- IV pharmacy to manage frequency 05/06/22 1954        Cultures were taken-   Microbiology Results (last 7 days)     Procedure Component Value Units Date/Time    Blood culture x two cultures. Draw prior to antibiotics. [[id number]] Collected: 05/06/22 1617    Order Status: Completed Specimen: Blood from Peripheral, Antecubital, Right Updated: 05/07/22 1341     Blood Culture, Routine Gram stain aer bottle: Gram positive cocci in clusters resembling Staph       Results called to and read back by: Lila Hernandez RN 05/07/2022  10:57      Gram stain missael bottle: Gram positive cocci in clusters resembling Staph       05/07/2022  13:40    Narrative:      Aerobic and anaerobic    Blood culture x two cultures. Draw prior to antibiotics. [[id number]] Collected: 05/06/22 1616    Order Status: Completed Specimen: Blood from Peripheral, Antecubital, Left Updated: 05/07/22 1057     Blood Culture, Routine Gram stain aer bottle: Gram positive cocci in clusters resembling Staph       Gram stain missael bottle: Gram positive cocci in clusters resembling Staph       Results called to and read back by: Lila Hernandez RN 05/07/2022  10:57    Narrative:      Aerobic and anaerobic    Gram stain [[id number]] Collected: 05/07/22 0901    Order Status: Sent Specimen: Body Fluid from Pleural Fluid Updated: 05/07/22 0905    Fungus culture [[id number]] Collected: 05/07/22 0901    Order Status: Sent Specimen: Body Fluid from Pleural Fluid Updated: 05/07/22 0905    AFB culture (includes stain) [[id number]] Collected: 05/07/22 0901    Order Status: Sent Specimen: Body Fluid from Pleural Fluid Updated: 05/07/22 0905        Latest lactate reviewed, they are-  Recent Labs   Lab 05/06/22  1616 05/06/22  1731   LACTATE 1.5 1.5       Organ dysfunction indicated by " Acute kidney injury  Source-  lung    Source control Achieved by-   Cont Broad spectrum IVAB     Bacteremia  Infectious disease consulted      PNA (pneumonia)  H/O IVDU   Cont broad spectrum IVAB   F/U blood and sputum cx   Will consult pulmonology for thoracentesis  To r/u empyema     5/7  Status post thoracentesis  Chest x-ray without signs of pneumothorax  Fluid studies pending        COPD (chronic obstructive pulmonary disease)  Cont breathing tx prn    Lower back pain  Chronic in nature\  H/O IVDU  CT lumbar show spinal stenosis   No neurological deficit at this time   Will order a MRI lumbar wwo  To r/o spinal abscess     5/7  Mri pending        VTE Risk Mitigation (From admission, onward)         Ordered     enoxaparin injection 40 mg  Daily         05/06/22 2336     IP VTE HIGH RISK PATIENT  Once         05/06/22 2336     Place sequential compression device  Until discontinued         05/06/22 2336                Discharge Planning   YESSICA:      Code Status: Full Code   Is the patient medically ready for discharge?:     Reason for patient still in hospital (select all that apply): Patient new problem, Patient trending condition, Laboratory test, Treatment, Imaging and Consult recommendations  Discharge Plan A: Home                  Elizabeth Kim MD  Department of Hospital Medicine   [facility] [facility]   Sandro Campbell)  Surgery  [address]  [place] [address]  Phone: [phone]  Fax: [phone]  Scheduled Appointment: 01/27/2022

## 2022-08-28 ENCOUNTER — EMERGENCY (EMERGENCY)
Facility: HOSPITAL | Age: 62
LOS: 0 days | Discharge: HOME | End: 2022-08-29
Attending: EMERGENCY MEDICINE | Admitting: EMERGENCY MEDICINE

## 2022-08-28 VITALS
RESPIRATION RATE: 19 BRPM | DIASTOLIC BLOOD PRESSURE: 83 MMHG | OXYGEN SATURATION: 103 % | HEIGHT: 74 IN | SYSTOLIC BLOOD PRESSURE: 129 MMHG | TEMPERATURE: 97 F | HEART RATE: 103 BPM

## 2022-08-28 DIAGNOSIS — Z87.19 PERSONAL HISTORY OF OTHER DISEASES OF THE DIGESTIVE SYSTEM: Chronic | ICD-10-CM

## 2022-08-28 DIAGNOSIS — Z23 ENCOUNTER FOR IMMUNIZATION: ICD-10-CM
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
## 2022-08-28 DIAGNOSIS — F10.929 ALCOHOL USE, UNSPECIFIED WITH INTOXICATION, UNSPECIFIED: ICD-10-CM

## 2022-08-28 DIAGNOSIS — Z12.11 ENCOUNTER FOR SCREENING FOR MALIGNANT NEOPLASM OF COLON: Chronic | ICD-10-CM
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
## 2022-08-28 DIAGNOSIS — Z20.822 CONTACT WITH AND (SUSPECTED) EXPOSURE TO COVID-19: ICD-10-CM

## 2022-08-28 PROCEDURE — 70486 CT MAXILLOFACIAL W/O DYE: CPT | Mod: 26,MA

## 2022-08-28 PROCEDURE — 70450 CT HEAD/BRAIN W/O DYE: CPT | Mod: 26,MA

## 2022-08-28 PROCEDURE — 99285 EMERGENCY DEPT VISIT HI MDM: CPT

## 2022-08-28 PROCEDURE — 72125 CT NECK SPINE W/O DYE: CPT | Mod: 26,MA

## 2022-08-28 RX ORDER — THIAMINE MONONITRATE (VIT B1) 100 MG
100 TABLET ORAL ONCE
Refills: 0 | Status: COMPLETED | OUTPATIENT
Start: 2022-08-28 | End: 2022-08-28

## 2022-08-28 RX ORDER — TETANUS TOXOID, REDUCED DIPHTHERIA TOXOID AND ACELLULAR PERTUSSIS VACCINE, ADSORBED 5; 2.5; 8; 8; 2.5 [IU]/.5ML; [IU]/.5ML; UG/.5ML; UG/.5ML; UG/.5ML
0.5 SUSPENSION INTRAMUSCULAR ONCE
Refills: 0 | Status: COMPLETED | OUTPATIENT
Start: 2022-08-28 | End: 2022-08-28

## 2022-08-28 RX ADMIN — Medication 100 MILLIGRAM(S): at 18:16

## 2022-08-28 RX ADMIN — TETANUS TOXOID, REDUCED DIPHTHERIA TOXOID AND ACELLULAR PERTUSSIS VACCINE, ADSORBED 0.5 MILLILITER(S): 5; 2.5; 8; 8; 2.5 SUSPENSION INTRAMUSCULAR at 18:16

## 2022-08-28 NOTE — ED PROVIDER NOTE - PATIENT PORTAL LINK FT
You can access the FollowMyHealth Patient Portal offered by [facility] by registering at the following website: [email or web address]. By joining [facility] [employer]’s FollowMyHealth portal, you will also be able to view your health information using other applications (apps) compatible with our system.

## 2022-08-28 NOTE — ED PROVIDER NOTE - PROGRESS NOTE DETAILS
Patient was signed out to me by Dr. Cohen pending sobriety and CT readings. I reviewed with CT reports and discussed with patient. Patient denies any symptoms. Patient wanted to go home, and he called his wife twice for ride, and stated that, she is not home and at work. As requested by him, I also called his wife once, and she stated that, she is at work until 8 am, and the phone got disconnected. Patient had been requesting to go home, but his gait is unsteady, attempted walking few times, but his gait is unsteady and could not walk two steps due to unsteady gait. I offered patient food and water/juice and were provided. Patient was educated multiple times on the importance of staying on the stretcher and risks of falls when he attempts to stand up to walk with unsteady gait. Patient is not ready for discharge at this time, due to his unsteady gait. Will continue to monitor patient closely. pt is ambulating, pt wants to go home. will dc. sxs that should prompt return explained to pt, verbalizes understanding. Patient was signed out to me by Dr. Cohen pending sobriety and CT readings. I reviewed with CT reports and discussed with patient. Patient denies any symptoms. Patient wanted to go home, and he called his wife twice for ride, and stated that, she is not home and at work. As requested by him, I also called his wife once, and she stated that, she is at work until 8 am, and the phone got disconnected. Patient had been requesting to go home, but his gait is unsteady, attempted walking few times, but his gait is unsteady and could not walk two steps due to unsteady gait. I offered patient food and water/juice and were provided. Patient was educated multiple times on the importance of staying on the stretcher and risks of falls when he attempts to stand up to walk with unsteady gait. Patient is not ready for discharge at this time, due to his unsteady gait. Will continue to monitor patient closely.  Patient remained awake, and verbally abusive towards the staff when approached to care for him. Patient is re-directable and not violent. Patient does not follow any instructions, when attempted to reevaluate, he screams, yells and verbally threats and does not follow the conversation. Patient wanted to leave, but his gait is unsteady. He made phone calls for his wife, but had no response. Multiple attempts made to assess the medical decision capacity and patient not answering questions asked, but he continues to scream and yell and threat. Patient did not participate in medical evaluation for medical decision capacity evaluation. Attempted ambulating in ED, but is unsteady, and not able to walk without holding on to the walls. Patient wanted to leave, but has unsteady gait, and unable to ambulate without the risk of falling. Patient could not have any family to take him home safely, I offered to make phone calls for him, initially he wanted to me to call his wife, so was called, but phone was disconnected by his wife, while I was on the phone to convey the message behalf of patient. After that, patient did not want us to call his wife, but he made his own phone calls. Patient did not participate in evaluation for medical decision capacity to leave AMA, and did not wanted medical transport for safe transport home. Patient wanted to walk out of the ED himself alone and take a CAB. Patient gait was unsteady and he could not walk few steps in ED without holding on to the walls. Discussed with patient about the benefits of the admission due to his unsteady gait, for further evaluation, he verbalized understanding and agreed. I ordered the labs, and collected the COVID-19 swab. Patient care transferred to Dr. Sanders at shift change 7 am, pending labs and admission.

## 2022-08-28 NOTE — ED PROVIDER NOTE - NS ED ATTENDING STATEMENT MOD
This was a shared visit with the PASHA. I reviewed and verified the documentation and independently performed the documented:

## 2022-08-28 NOTE — ED PROVIDER NOTE - CLINICAL SUMMARY MEDICAL DECISION MAKING FREE TEXT BOX
61-year-old male presents to the ED for trip and fall and EtOH intoxication.  Case was seen by the initial team and signed out pending final evaluation disposition.  Labs reviewed by me noted to to have mild hypokalemia and hypomagnesemia.  CT imaging negative for trauma.  Foot x-ray negative for fractures or dislocation.  Patient on reevaluation is alert and oriented noted to have abrasions to multiple areas of the face.  Is ambulatory with no gait dysfunction.  Patient requested to leave.  Previous conversation with patient and previous attending was to admit the patient for due to the inability to get a concrete history and the patient was not cooperative.  However on my evaluation patient was ANO x3 and had capacity and requested to be discharged.  Discharged home.  Evaluated gait with no gait dysfunction currently.

## 2022-08-28 NOTE — ED PROVIDER NOTE - OBJECTIVE STATEMENT
61-year-old male presents emergency department after trip and fall while drinking alcohol. Patient states he drinks daily, was drinking his normal amount today was walking outside tripped and fell forward into a tree and fell to the ground. Denies LOC patient with abrasions to his face but states that he feels well and wants to go home. Denies headache vision change neck pain chest pain shortness of breath abdominal pain nausea vomiting diarrhea dizziness lightheadedness or syncope

## 2022-08-28 NOTE — ED PROVIDER NOTE - PHYSICAL EXAMINATION
CONST: Well appearing in NAD  EYES: PERRL, EOMI, Sclera and conjunctiva clear. Vision grossly intact  ENT: Patient with abrasions to multiple areas of the face no lacerations noted, abrasions to occipital scalp as well  NECK: Non-tender, no meningeal signs  CARD: Normal S1 S2; Normal rate and rhythm  RESP: Equal BS B/L, No wheezes, rhonchi or rales. No distress  GI: Soft, non-tender, non-distended. No rebound or Guarding  MS: Normal ROM in all extremities. No midline spinal tenderness.  SKIN: Warm, dry, no acute rashes. Good turgor  NEURO: A&Ox3, No focal deficits. Strength 5/5 with no sensory deficits. Steady gait

## 2022-08-28 NOTE — ED PROVIDER NOTE - ATTENDING APP SHARED VISIT CONTRIBUTION OF CARE
60yo M presenting sp fall. Per pt, has been drinking, fell, +HI. Unknown LOC. States no AC. disheveled. multiple abrasions to face PERRLA EOMI neck supple non tender normal wob cta bl rrr abdomen s nt nd no rebound no guarding WWPx4 neuro non focal multiple abrasions to b/l LE, no bony ttp throughout, no sx trauma to trunk.

## 2022-08-28 NOTE — ED ADULT NURSE NOTE - OBJECTIVE STATEMENT
BIBEMS from home for altered mental status and fall. A&Ox2, abrasion noted on forehead and chin, laceration to back of head noted, bleeding controlled upon arrival.

## 2022-08-29 VITALS
DIASTOLIC BLOOD PRESSURE: 89 MMHG | OXYGEN SATURATION: 98 % | TEMPERATURE: 98 F | RESPIRATION RATE: 18 BRPM | HEART RATE: 74 BPM | SYSTOLIC BLOOD PRESSURE: 123 MMHG

## 2022-08-29 LAB
ALBUMIN SERPL ELPH-MCNC: 4.2 G/DL — SIGNIFICANT CHANGE UP (ref 3.5–5.2)
ALP SERPL-CCNC: 108 U/L — SIGNIFICANT CHANGE UP (ref 30–115)
ALT FLD-CCNC: 42 U/L — HIGH (ref 0–41)
ANION GAP SERPL CALC-SCNC: 11 MMOL/L — SIGNIFICANT CHANGE UP (ref 7–14)
AST SERPL-CCNC: 58 U/L — HIGH (ref 0–41)
BASOPHILS # BLD AUTO: 0.03 K/UL — SIGNIFICANT CHANGE UP (ref 0–0.2)
BASOPHILS NFR BLD AUTO: 0.9 % — SIGNIFICANT CHANGE UP (ref 0–1)
BILIRUB SERPL-MCNC: 1.4 MG/DL — HIGH (ref 0.2–1.2)
BUN SERPL-MCNC: 9 MG/DL — LOW (ref 10–20)
CALCIUM SERPL-MCNC: 8.7 MG/DL — SIGNIFICANT CHANGE UP (ref 8.5–10.1)
CHLORIDE SERPL-SCNC: 104 MMOL/L — SIGNIFICANT CHANGE UP (ref 98–110)
CO2 SERPL-SCNC: 28 MMOL/L — SIGNIFICANT CHANGE UP (ref 17–32)
CREAT SERPL-MCNC: 0.8 MG/DL — SIGNIFICANT CHANGE UP (ref 0.7–1.5)
EGFR: 102 ML/MIN/1.73M2 — SIGNIFICANT CHANGE UP
EOSINOPHIL # BLD AUTO: 0.13 K/UL — SIGNIFICANT CHANGE UP (ref 0–0.7)
EOSINOPHIL NFR BLD AUTO: 3.5 % — SIGNIFICANT CHANGE UP (ref 0–8)
GIANT PLATELETS BLD QL SMEAR: PRESENT — SIGNIFICANT CHANGE UP
GLUCOSE SERPL-MCNC: 126 MG/DL — HIGH (ref 70–99)
HCT VFR BLD CALC: 40.5 % — LOW (ref 42–52)
HGB BLD-MCNC: 14.6 G/DL — SIGNIFICANT CHANGE UP (ref 14–18)
LYMPHOCYTES # BLD AUTO: 0.77 K/UL — LOW (ref 1.2–3.4)
LYMPHOCYTES # BLD AUTO: 20.3 % — LOW (ref 20.5–51.1)
MAGNESIUM SERPL-MCNC: 1.5 MG/DL — LOW (ref 1.8–2.4)
MANUAL SMEAR VERIFICATION: SIGNIFICANT CHANGE UP
MCHC RBC-ENTMCNC: 34.4 PG — HIGH (ref 27–31)
MCHC RBC-ENTMCNC: 36 G/DL — SIGNIFICANT CHANGE UP (ref 32–37)
MCV RBC AUTO: 95.3 FL — HIGH (ref 80–94)
MONOCYTES # BLD AUTO: 0.27 K/UL — SIGNIFICANT CHANGE UP (ref 0.1–0.6)
MONOCYTES NFR BLD AUTO: 7.1 % — SIGNIFICANT CHANGE UP (ref 1.7–9.3)
NEUTROPHILS # BLD AUTO: 2.54 K/UL — SIGNIFICANT CHANGE UP (ref 1.4–6.5)
NEUTROPHILS NFR BLD AUTO: 67.3 % — SIGNIFICANT CHANGE UP (ref 42.2–75.2)
PLAT MORPH BLD: NORMAL — SIGNIFICANT CHANGE UP
PLATELET # BLD AUTO: 76 K/UL — LOW (ref 130–400)
POLYCHROMASIA BLD QL SMEAR: SLIGHT — SIGNIFICANT CHANGE UP
POTASSIUM SERPL-MCNC: 3.4 MMOL/L — LOW (ref 3.5–5)
POTASSIUM SERPL-SCNC: 3.4 MMOL/L — LOW (ref 3.5–5)
PROT SERPL-MCNC: 6.8 G/DL — SIGNIFICANT CHANGE UP (ref 6–8)
RBC # BLD: 4.25 M/UL — LOW (ref 4.7–6.1)
RBC # FLD: 12.7 % — SIGNIFICANT CHANGE UP (ref 11.5–14.5)
RBC BLD AUTO: NORMAL — SIGNIFICANT CHANGE UP
SARS-COV-2 RNA SPEC QL NAA+PROBE: SIGNIFICANT CHANGE UP
SODIUM SERPL-SCNC: 143 MMOL/L — SIGNIFICANT CHANGE UP (ref 135–146)
VARIANT LYMPHS # BLD: 0.9 % — SIGNIFICANT CHANGE UP (ref 0–5)
WBC # BLD: 3.78 K/UL — LOW (ref 4.8–10.8)
WBC # FLD AUTO: 3.78 K/UL — LOW (ref 4.8–10.8)

## 2022-08-29 PROCEDURE — 73630 X-RAY EXAM OF FOOT: CPT | Mod: 26,RT

## 2022-08-29 RX ADMIN — Medication 50 MILLIGRAM(S): at 04:18

## 2022-08-29 NOTE — ED ADULT NURSE REASSESSMENT NOTE - NS ED NURSE REASSESS COMMENT FT1
Pt repeatedly asking if he can leave the Emergency Department. Attempted to ambulate, however incredibly unsteady on ambulation. On assessment of mental status, pt repeatedly stated that the current year was 1922. Upon reassessment, pt gave permission to call his wife to discuss him being able to leave. However, after call was made by Dr. Perdomo, pt used phone to call his wife's workplace multiple times after. Now expressing that he is upset that we called his wife. at this time, 0030, pt still has unsteady gait and was unable to ambulate > 5 feet without holding onto desk or stretchers.

## 2022-08-29 NOTE — ED ADULT NURSE REASSESSMENT NOTE - NS ED NURSE REASSESS COMMENT FT1
Patient extremely restless, and verbally abusive towards staff security called numerous times due to agitation, Patient requesting to leave, MD made aware and decided patient unable to leave due to unsteady gait.  Patient has been reassessed numerous times and patient continues to have unsteady gait. Patient is refusing to complete AMA assessment with MD [place]. Charge RN made aware of current situation.

## 2023-02-07 ENCOUNTER — OUTPATIENT (OUTPATIENT)
Dept: OUTPATIENT SERVICES | Facility: HOSPITAL | Age: 63
LOS: 1 days | End: 2023-02-07

## 2023-02-07 DIAGNOSIS — Z12.11 ENCOUNTER FOR SCREENING FOR MALIGNANT NEOPLASM OF COLON: Chronic | ICD-10-CM

## 2023-02-07 DIAGNOSIS — K02.7 DENTAL ROOT CARIES: ICD-10-CM

## 2023-02-07 DIAGNOSIS — K05.6 PERIODONTAL DISEASE, UNSPECIFIED: ICD-10-CM

## 2023-02-07 DIAGNOSIS — Z87.19 PERSONAL HISTORY OF OTHER DISEASES OF THE DIGESTIVE SYSTEM: Chronic | ICD-10-CM

## 2023-02-07 PROCEDURE — D0330: CPT

## 2023-02-07 PROCEDURE — D0230: CPT

## 2023-05-19 NOTE — PATIENT PROFILE ADULT. - NSTRANFUSIONOBJECTION_GEN_ALL_CORE_SIUH
----- Message from Floresita Rajput MD sent at 5/19/2023 12:55 PM CDT -----  Continue to monitor with the present treatment.  Advised to continue to occasionally check his blood pressure and can send a message if any concern until his follow-up appointment. TY  ----- Message -----  From: Mary Ann Bhagat MA  Sent: 5/19/2023  10:46 AM CDT  To: Floresita Rajput MD    Patient has not been taking b/p at home however today his pressure today is 128/88 and he has not been feeling the palpations anymore        Patient has no objection to blood transfusions.

## 2023-05-19 NOTE — ED ADULT NURSE NOTE - NS ED NURSE LEVEL OF CONSCIOUSNESS ORIENTATION
Tele call to pharmacy to ascertain status of naltrexone. \"we have contacted the patient, she stated she would call us back to relay payment information, receive teaching re: naltrexone and discuss with a pharmacist.  The patient has not called back.  The cost of 2-7 day RX and 1-90 RX of naltrexone is $83.\"   Disoriented

## 2023-06-13 ENCOUNTER — OUTPATIENT (OUTPATIENT)
Dept: OUTPATIENT SERVICES | Facility: HOSPITAL | Age: 63
LOS: 1 days | End: 2023-06-13
Payer: COMMERCIAL

## 2023-06-13 DIAGNOSIS — Z12.11 ENCOUNTER FOR SCREENING FOR MALIGNANT NEOPLASM OF COLON: Chronic | ICD-10-CM

## 2023-06-13 DIAGNOSIS — K02.7 DENTAL ROOT CARIES: ICD-10-CM

## 2023-06-13 DIAGNOSIS — Z87.19 PERSONAL HISTORY OF OTHER DISEASES OF THE DIGESTIVE SYSTEM: Chronic | ICD-10-CM

## 2023-06-13 PROCEDURE — D3320: CPT

## 2023-06-14 DIAGNOSIS — K02.7 DENTAL ROOT CARIES: ICD-10-CM

## 2023-06-15 DIAGNOSIS — K02.63 DENTAL CARIES ON SMOOTH SURFACE PENETRATING INTO PULP: ICD-10-CM

## 2023-06-20 ENCOUNTER — OUTPATIENT (OUTPATIENT)
Dept: OUTPATIENT SERVICES | Facility: HOSPITAL | Age: 63
LOS: 1 days | End: 2023-06-20

## 2023-06-20 DIAGNOSIS — K02.7 DENTAL ROOT CARIES: ICD-10-CM

## 2023-06-20 DIAGNOSIS — Z12.11 ENCOUNTER FOR SCREENING FOR MALIGNANT NEOPLASM OF COLON: Chronic | ICD-10-CM

## 2023-06-20 DIAGNOSIS — Z87.19 PERSONAL HISTORY OF OTHER DISEASES OF THE DIGESTIVE SYSTEM: Chronic | ICD-10-CM

## 2023-06-26 DIAGNOSIS — K02.63 DENTAL CARIES ON SMOOTH SURFACE PENETRATING INTO PULP: ICD-10-CM

## 2023-06-27 ENCOUNTER — OUTPATIENT (OUTPATIENT)
Dept: OUTPATIENT SERVICES | Facility: HOSPITAL | Age: 63
LOS: 1 days | End: 2023-06-27
Payer: COMMERCIAL

## 2023-06-27 DIAGNOSIS — Z12.11 ENCOUNTER FOR SCREENING FOR MALIGNANT NEOPLASM OF COLON: Chronic | ICD-10-CM

## 2023-06-27 DIAGNOSIS — K02.7 DENTAL ROOT CARIES: ICD-10-CM

## 2023-06-27 DIAGNOSIS — Z87.19 PERSONAL HISTORY OF OTHER DISEASES OF THE DIGESTIVE SYSTEM: Chronic | ICD-10-CM

## 2023-06-27 PROCEDURE — D3320: CPT

## 2023-06-28 DIAGNOSIS — K02.7 DENTAL ROOT CARIES: ICD-10-CM

## 2023-06-29 DIAGNOSIS — K02.63 DENTAL CARIES ON SMOOTH SURFACE PENETRATING INTO PULP: ICD-10-CM

## 2023-07-11 ENCOUNTER — OUTPATIENT (OUTPATIENT)
Dept: OUTPATIENT SERVICES | Facility: HOSPITAL | Age: 63
LOS: 1 days | End: 2023-07-11

## 2023-07-11 DIAGNOSIS — Z87.19 PERSONAL HISTORY OF OTHER DISEASES OF THE DIGESTIVE SYSTEM: Chronic | ICD-10-CM

## 2023-07-11 DIAGNOSIS — K02.7 DENTAL ROOT CARIES: ICD-10-CM

## 2023-07-11 DIAGNOSIS — Z12.11 ENCOUNTER FOR SCREENING FOR MALIGNANT NEOPLASM OF COLON: Chronic | ICD-10-CM

## 2023-07-18 ENCOUNTER — OUTPATIENT (OUTPATIENT)
Dept: OUTPATIENT SERVICES | Facility: HOSPITAL | Age: 63
LOS: 1 days | End: 2023-07-18

## 2023-07-18 DIAGNOSIS — K02.7 DENTAL ROOT CARIES: ICD-10-CM

## 2023-07-18 DIAGNOSIS — Z87.19 PERSONAL HISTORY OF OTHER DISEASES OF THE DIGESTIVE SYSTEM: Chronic | ICD-10-CM

## 2023-07-18 DIAGNOSIS — Z12.11 ENCOUNTER FOR SCREENING FOR MALIGNANT NEOPLASM OF COLON: Chronic | ICD-10-CM

## 2023-07-25 ENCOUNTER — OUTPATIENT (OUTPATIENT)
Dept: OUTPATIENT SERVICES | Facility: HOSPITAL | Age: 63
LOS: 1 days | End: 2023-07-25

## 2023-07-25 DIAGNOSIS — Z12.11 ENCOUNTER FOR SCREENING FOR MALIGNANT NEOPLASM OF COLON: Chronic | ICD-10-CM

## 2023-07-25 DIAGNOSIS — K02.7 DENTAL ROOT CARIES: ICD-10-CM

## 2023-07-25 DIAGNOSIS — Z87.19 PERSONAL HISTORY OF OTHER DISEASES OF THE DIGESTIVE SYSTEM: Chronic | ICD-10-CM

## 2023-07-27 DIAGNOSIS — K02.63 DENTAL CARIES ON SMOOTH SURFACE PENETRATING INTO PULP: ICD-10-CM

## 2023-07-28 DIAGNOSIS — K02.63 DENTAL CARIES ON SMOOTH SURFACE PENETRATING INTO PULP: ICD-10-CM

## 2023-08-01 DIAGNOSIS — K04.4 ACUTE APICAL PERIODONTITIS OF PULPAL ORIGIN: ICD-10-CM

## 2023-08-03 ENCOUNTER — OUTPATIENT (OUTPATIENT)
Dept: OUTPATIENT SERVICES | Facility: HOSPITAL | Age: 63
LOS: 1 days | End: 2023-08-03
Payer: COMMERCIAL

## 2023-08-03 DIAGNOSIS — Z01.20 ENCOUNTER FOR DENTAL EXAMINATION AND CLEANING WITHOUT ABNORMAL FINDINGS: ICD-10-CM

## 2023-08-03 DIAGNOSIS — Z12.11 ENCOUNTER FOR SCREENING FOR MALIGNANT NEOPLASM OF COLON: Chronic | ICD-10-CM

## 2023-08-03 DIAGNOSIS — Z87.19 PERSONAL HISTORY OF OTHER DISEASES OF THE DIGESTIVE SYSTEM: Chronic | ICD-10-CM

## 2023-08-03 PROCEDURE — D0220: CPT

## 2023-08-03 PROCEDURE — D1110: CPT

## 2023-08-03 PROCEDURE — D0230: CPT

## 2023-08-03 PROCEDURE — D0120: CPT

## 2023-08-04 ENCOUNTER — OUTPATIENT (OUTPATIENT)
Dept: OUTPATIENT SERVICES | Facility: HOSPITAL | Age: 63
LOS: 1 days | End: 2023-08-04
Payer: COMMERCIAL

## 2023-08-04 DIAGNOSIS — Z01.20 ENCOUNTER FOR DENTAL EXAMINATION AND CLEANING WITHOUT ABNORMAL FINDINGS: ICD-10-CM

## 2023-08-04 DIAGNOSIS — Z12.11 ENCOUNTER FOR SCREENING FOR MALIGNANT NEOPLASM OF COLON: Chronic | ICD-10-CM

## 2023-08-04 DIAGNOSIS — Z87.19 PERSONAL HISTORY OF OTHER DISEASES OF THE DIGESTIVE SYSTEM: Chronic | ICD-10-CM

## 2023-08-04 DIAGNOSIS — K02.52 DENTAL CARIES ON PIT AND FISSURE SURFACE PENETRATING INTO DENTIN: ICD-10-CM

## 2023-08-04 PROCEDURE — D2150: CPT

## 2023-08-07 DIAGNOSIS — K02.52 DENTAL CARIES ON PIT AND FISSURE SURFACE PENETRATING INTO DENTIN: ICD-10-CM

## 2023-09-20 ENCOUNTER — APPOINTMENT (OUTPATIENT)
Dept: SURGERY | Facility: CLINIC | Age: 63
End: 2023-09-20
Payer: MEDICAID

## 2023-09-20 VITALS — WEIGHT: 218 LBS | HEIGHT: 74 IN | BODY MASS INDEX: 27.98 KG/M2

## 2023-09-20 DIAGNOSIS — K43.2 INCISIONAL HERNIA W/OUT OBSTRUCTION OR GANGRENE: ICD-10-CM

## 2023-09-20 PROCEDURE — 99214 OFFICE O/P EST MOD 30 MIN: CPT

## 2023-09-26 ENCOUNTER — NON-APPOINTMENT (OUTPATIENT)
Age: 63
End: 2023-09-26

## 2023-10-17 ENCOUNTER — OUTPATIENT (OUTPATIENT)
Dept: OUTPATIENT SERVICES | Facility: HOSPITAL | Age: 63
LOS: 1 days | End: 2023-10-17
Payer: COMMERCIAL

## 2023-10-17 DIAGNOSIS — Z87.19 PERSONAL HISTORY OF OTHER DISEASES OF THE DIGESTIVE SYSTEM: Chronic | ICD-10-CM

## 2023-10-17 DIAGNOSIS — Z12.11 ENCOUNTER FOR SCREENING FOR MALIGNANT NEOPLASM OF COLON: Chronic | ICD-10-CM

## 2023-10-17 DIAGNOSIS — K02.7 DENTAL ROOT CARIES: ICD-10-CM

## 2023-10-17 PROCEDURE — D9310: CPT

## 2023-10-31 DIAGNOSIS — K02.9 DENTAL CARIES, UNSPECIFIED: ICD-10-CM

## 2023-12-29 ENCOUNTER — OUTPATIENT (OUTPATIENT)
Dept: OUTPATIENT SERVICES | Facility: HOSPITAL | Age: 63
LOS: 1 days | End: 2023-12-29
Payer: COMMERCIAL

## 2023-12-29 DIAGNOSIS — Z12.11 ENCOUNTER FOR SCREENING FOR MALIGNANT NEOPLASM OF COLON: Chronic | ICD-10-CM

## 2023-12-29 DIAGNOSIS — Z87.19 PERSONAL HISTORY OF OTHER DISEASES OF THE DIGESTIVE SYSTEM: Chronic | ICD-10-CM

## 2023-12-29 DIAGNOSIS — K02.52 DENTAL CARIES ON PIT AND FISSURE SURFACE PENETRATING INTO DENTIN: ICD-10-CM

## 2023-12-29 PROCEDURE — D2150: CPT

## 2023-12-30 DIAGNOSIS — K02.9 DENTAL CARIES, UNSPECIFIED: ICD-10-CM

## 2024-03-07 ENCOUNTER — OUTPATIENT (OUTPATIENT)
Dept: OUTPATIENT SERVICES | Facility: HOSPITAL | Age: 64
LOS: 1 days | End: 2024-03-07
Payer: COMMERCIAL

## 2024-03-07 DIAGNOSIS — Z12.11 ENCOUNTER FOR SCREENING FOR MALIGNANT NEOPLASM OF COLON: Chronic | ICD-10-CM

## 2024-03-07 DIAGNOSIS — K08.109 COMPLETE LOSS OF TEETH, UNSPECIFIED CAUSE, UNSPECIFIED CLASS: ICD-10-CM

## 2024-03-07 DIAGNOSIS — Z87.19 PERSONAL HISTORY OF OTHER DISEASES OF THE DIGESTIVE SYSTEM: Chronic | ICD-10-CM

## 2024-03-07 PROCEDURE — D2954: CPT

## 2024-03-08 DIAGNOSIS — K02.9 DENTAL CARIES, UNSPECIFIED: ICD-10-CM

## 2024-03-08 DIAGNOSIS — K08.109 COMPLETE LOSS OF TEETH, UNSPECIFIED CAUSE, UNSPECIFIED CLASS: ICD-10-CM

## 2024-03-19 ENCOUNTER — OUTPATIENT (OUTPATIENT)
Dept: OUTPATIENT SERVICES | Facility: HOSPITAL | Age: 64
LOS: 1 days | End: 2024-03-19

## 2024-03-19 DIAGNOSIS — Z12.11 ENCOUNTER FOR SCREENING FOR MALIGNANT NEOPLASM OF COLON: Chronic | ICD-10-CM

## 2024-03-19 DIAGNOSIS — K08.109 COMPLETE LOSS OF TEETH, UNSPECIFIED CAUSE, UNSPECIFIED CLASS: ICD-10-CM

## 2024-03-19 DIAGNOSIS — Z87.19 PERSONAL HISTORY OF OTHER DISEASES OF THE DIGESTIVE SYSTEM: Chronic | ICD-10-CM

## 2024-03-21 ENCOUNTER — OUTPATIENT (OUTPATIENT)
Dept: OUTPATIENT SERVICES | Facility: HOSPITAL | Age: 64
LOS: 1 days | End: 2024-03-21
Payer: COMMERCIAL

## 2024-03-21 DIAGNOSIS — K02.9 DENTAL CARIES, UNSPECIFIED: ICD-10-CM

## 2024-03-21 DIAGNOSIS — Z12.11 ENCOUNTER FOR SCREENING FOR MALIGNANT NEOPLASM OF COLON: Chronic | ICD-10-CM

## 2024-03-21 DIAGNOSIS — K08.109 COMPLETE LOSS OF TEETH, UNSPECIFIED CAUSE, UNSPECIFIED CLASS: ICD-10-CM

## 2024-03-21 DIAGNOSIS — Z87.19 PERSONAL HISTORY OF OTHER DISEASES OF THE DIGESTIVE SYSTEM: Chronic | ICD-10-CM

## 2024-03-21 PROCEDURE — D2752: CPT

## 2024-03-22 DIAGNOSIS — K08.109 COMPLETE LOSS OF TEETH, UNSPECIFIED CAUSE, UNSPECIFIED CLASS: ICD-10-CM

## 2024-04-11 ENCOUNTER — OUTPATIENT (OUTPATIENT)
Dept: OUTPATIENT SERVICES | Facility: HOSPITAL | Age: 64
LOS: 1 days | End: 2024-04-11

## 2024-04-11 DIAGNOSIS — K08.109 COMPLETE LOSS OF TEETH, UNSPECIFIED CAUSE, UNSPECIFIED CLASS: ICD-10-CM

## 2024-04-11 DIAGNOSIS — Z87.19 PERSONAL HISTORY OF OTHER DISEASES OF THE DIGESTIVE SYSTEM: Chronic | ICD-10-CM

## 2024-04-11 DIAGNOSIS — Z12.11 ENCOUNTER FOR SCREENING FOR MALIGNANT NEOPLASM OF COLON: Chronic | ICD-10-CM

## 2024-05-02 ENCOUNTER — OUTPATIENT (OUTPATIENT)
Dept: OUTPATIENT SERVICES | Facility: HOSPITAL | Age: 64
LOS: 1 days | End: 2024-05-02

## 2024-05-02 DIAGNOSIS — K08.109 COMPLETE LOSS OF TEETH, UNSPECIFIED CAUSE, UNSPECIFIED CLASS: ICD-10-CM

## 2024-05-02 DIAGNOSIS — Z12.11 ENCOUNTER FOR SCREENING FOR MALIGNANT NEOPLASM OF COLON: Chronic | ICD-10-CM

## 2024-05-02 DIAGNOSIS — Z87.19 PERSONAL HISTORY OF OTHER DISEASES OF THE DIGESTIVE SYSTEM: Chronic | ICD-10-CM

## 2024-05-23 ENCOUNTER — OUTPATIENT (OUTPATIENT)
Dept: OUTPATIENT SERVICES | Facility: HOSPITAL | Age: 64
LOS: 1 days | End: 2024-05-23

## 2024-05-23 DIAGNOSIS — Z87.19 PERSONAL HISTORY OF OTHER DISEASES OF THE DIGESTIVE SYSTEM: Chronic | ICD-10-CM

## 2024-05-23 DIAGNOSIS — K08.109 COMPLETE LOSS OF TEETH, UNSPECIFIED CAUSE, UNSPECIFIED CLASS: ICD-10-CM

## 2024-05-23 DIAGNOSIS — Z12.11 ENCOUNTER FOR SCREENING FOR MALIGNANT NEOPLASM OF COLON: Chronic | ICD-10-CM

## 2024-06-04 ENCOUNTER — OUTPATIENT (OUTPATIENT)
Dept: OUTPATIENT SERVICES | Facility: HOSPITAL | Age: 64
LOS: 1 days | End: 2024-06-04

## 2024-06-04 DIAGNOSIS — Z87.19 PERSONAL HISTORY OF OTHER DISEASES OF THE DIGESTIVE SYSTEM: Chronic | ICD-10-CM

## 2024-06-04 DIAGNOSIS — Z12.11 ENCOUNTER FOR SCREENING FOR MALIGNANT NEOPLASM OF COLON: Chronic | ICD-10-CM

## 2024-06-04 DIAGNOSIS — K02.7 DENTAL ROOT CARIES: ICD-10-CM

## 2024-06-26 ENCOUNTER — OUTPATIENT (OUTPATIENT)
Dept: OUTPATIENT SERVICES | Facility: HOSPITAL | Age: 64
LOS: 1 days | End: 2024-06-26

## 2024-06-26 DIAGNOSIS — K01.1 IMPACTED TEETH: ICD-10-CM

## 2024-06-26 DIAGNOSIS — Z12.11 ENCOUNTER FOR SCREENING FOR MALIGNANT NEOPLASM OF COLON: Chronic | ICD-10-CM

## 2024-06-26 DIAGNOSIS — Z87.19 PERSONAL HISTORY OF OTHER DISEASES OF THE DIGESTIVE SYSTEM: Chronic | ICD-10-CM

## 2024-06-26 PROCEDURE — D7140: CPT

## 2024-08-29 ENCOUNTER — OUTPATIENT (OUTPATIENT)
Dept: OUTPATIENT SERVICES | Facility: HOSPITAL | Age: 64
LOS: 1 days | End: 2024-08-29
Payer: MEDICAID

## 2024-08-29 DIAGNOSIS — K08.109 COMPLETE LOSS OF TEETH, UNSPECIFIED CAUSE, UNSPECIFIED CLASS: ICD-10-CM

## 2024-08-29 DIAGNOSIS — Z12.11 ENCOUNTER FOR SCREENING FOR MALIGNANT NEOPLASM OF COLON: Chronic | ICD-10-CM

## 2024-08-29 DIAGNOSIS — Z87.19 PERSONAL HISTORY OF OTHER DISEASES OF THE DIGESTIVE SYSTEM: Chronic | ICD-10-CM

## 2024-08-29 PROCEDURE — D2954: CPT

## 2024-08-30 DIAGNOSIS — K08.109 COMPLETE LOSS OF TEETH, UNSPECIFIED CAUSE, UNSPECIFIED CLASS: ICD-10-CM

## 2024-09-12 ENCOUNTER — OUTPATIENT (OUTPATIENT)
Dept: OUTPATIENT SERVICES | Facility: HOSPITAL | Age: 64
LOS: 1 days | End: 2024-09-12

## 2024-09-12 DIAGNOSIS — Z87.19 PERSONAL HISTORY OF OTHER DISEASES OF THE DIGESTIVE SYSTEM: Chronic | ICD-10-CM

## 2024-09-12 DIAGNOSIS — K02.9 DENTAL CARIES, UNSPECIFIED: ICD-10-CM

## 2024-09-12 DIAGNOSIS — Z12.11 ENCOUNTER FOR SCREENING FOR MALIGNANT NEOPLASM OF COLON: Chronic | ICD-10-CM

## 2024-09-12 PROCEDURE — D9310: CPT

## 2024-09-16 DIAGNOSIS — K08.409 PARTIAL LOSS OF TEETH, UNSPECIFIED CAUSE, UNSPECIFIED CLASS: ICD-10-CM

## 2024-09-23 ENCOUNTER — OUTPATIENT (OUTPATIENT)
Dept: OUTPATIENT SERVICES | Facility: HOSPITAL | Age: 64
LOS: 1 days | End: 2024-09-23
Payer: MEDICAID

## 2024-09-23 DIAGNOSIS — Z12.11 ENCOUNTER FOR SCREENING FOR MALIGNANT NEOPLASM OF COLON: Chronic | ICD-10-CM

## 2024-09-23 DIAGNOSIS — K02.9 DENTAL CARIES, UNSPECIFIED: ICD-10-CM

## 2024-09-23 DIAGNOSIS — Z87.19 PERSONAL HISTORY OF OTHER DISEASES OF THE DIGESTIVE SYSTEM: Chronic | ICD-10-CM

## 2024-09-23 PROCEDURE — D0170: CPT

## 2024-09-26 DIAGNOSIS — K08.409 PARTIAL LOSS OF TEETH, UNSPECIFIED CAUSE, UNSPECIFIED CLASS: ICD-10-CM

## 2024-11-07 ENCOUNTER — OUTPATIENT (OUTPATIENT)
Dept: OUTPATIENT SERVICES | Facility: HOSPITAL | Age: 64
LOS: 1 days | End: 2024-11-07

## 2024-11-07 DIAGNOSIS — Z12.11 ENCOUNTER FOR SCREENING FOR MALIGNANT NEOPLASM OF COLON: Chronic | ICD-10-CM

## 2024-11-07 DIAGNOSIS — K08.109 COMPLETE LOSS OF TEETH, UNSPECIFIED CAUSE, UNSPECIFIED CLASS: ICD-10-CM

## 2024-11-07 DIAGNOSIS — Z87.19 PERSONAL HISTORY OF OTHER DISEASES OF THE DIGESTIVE SYSTEM: Chronic | ICD-10-CM

## 2024-11-07 DIAGNOSIS — Z01.20 ENCOUNTER FOR DENTAL EXAMINATION AND CLEANING WITHOUT ABNORMAL FINDINGS: ICD-10-CM

## 2024-11-07 PROCEDURE — D0274: CPT

## 2024-11-07 PROCEDURE — D0120: CPT

## 2024-11-07 PROCEDURE — D1110: CPT

## 2024-11-07 PROCEDURE — D0230: CPT

## 2024-11-08 DIAGNOSIS — Z01.21 ENCOUNTER FOR DENTAL EXAMINATION AND CLEANING WITH ABNORMAL FINDINGS: ICD-10-CM

## 2024-11-14 NOTE — DISCHARGE NOTE ADULT - THE PATIENT HAS
Spoke with Rashida at home care who took verbal order for pt to be followed by home care with Dr. Feliz. No additional needs at this time.    no difficulties

## 2024-12-19 ENCOUNTER — OUTPATIENT (OUTPATIENT)
Dept: OUTPATIENT SERVICES | Facility: HOSPITAL | Age: 64
LOS: 1 days | End: 2024-12-19
Payer: MEDICAID

## 2024-12-19 DIAGNOSIS — Z87.19 PERSONAL HISTORY OF OTHER DISEASES OF THE DIGESTIVE SYSTEM: Chronic | ICD-10-CM

## 2024-12-19 DIAGNOSIS — K02.9 DENTAL CARIES, UNSPECIFIED: ICD-10-CM

## 2024-12-19 DIAGNOSIS — Z12.11 ENCOUNTER FOR SCREENING FOR MALIGNANT NEOPLASM OF COLON: Chronic | ICD-10-CM

## 2024-12-19 PROCEDURE — D5213: CPT

## 2024-12-20 DIAGNOSIS — K02.9 DENTAL CARIES, UNSPECIFIED: ICD-10-CM

## 2025-01-16 ENCOUNTER — OUTPATIENT (OUTPATIENT)
Dept: OUTPATIENT SERVICES | Facility: HOSPITAL | Age: 65
LOS: 1 days | End: 2025-01-16
Payer: MEDICAID

## 2025-01-16 DIAGNOSIS — Z12.11 ENCOUNTER FOR SCREENING FOR MALIGNANT NEOPLASM OF COLON: Chronic | ICD-10-CM

## 2025-01-16 DIAGNOSIS — Z87.19 PERSONAL HISTORY OF OTHER DISEASES OF THE DIGESTIVE SYSTEM: Chronic | ICD-10-CM

## 2025-01-16 DIAGNOSIS — K01.1 IMPACTED TEETH: ICD-10-CM

## 2025-01-16 PROCEDURE — D7140: CPT

## 2025-01-17 DIAGNOSIS — K01.1 IMPACTED TEETH: ICD-10-CM

## 2025-02-05 ENCOUNTER — OUTPATIENT (OUTPATIENT)
Dept: OUTPATIENT SERVICES | Facility: HOSPITAL | Age: 65
LOS: 1 days | End: 2025-02-05
Payer: MEDICAID

## 2025-02-05 DIAGNOSIS — Z12.11 ENCOUNTER FOR SCREENING FOR MALIGNANT NEOPLASM OF COLON: Chronic | ICD-10-CM

## 2025-02-05 DIAGNOSIS — K08.409 PARTIAL LOSS OF TEETH, UNSPECIFIED CAUSE, UNSPECIFIED CLASS: ICD-10-CM

## 2025-02-05 DIAGNOSIS — Z87.19 PERSONAL HISTORY OF OTHER DISEASES OF THE DIGESTIVE SYSTEM: Chronic | ICD-10-CM

## 2025-02-05 PROCEDURE — D2150: CPT

## 2025-02-05 PROCEDURE — D5213: CPT

## 2025-02-06 DIAGNOSIS — K08.409 PARTIAL LOSS OF TEETH, UNSPECIFIED CAUSE, UNSPECIFIED CLASS: ICD-10-CM

## 2025-02-19 ENCOUNTER — OUTPATIENT (OUTPATIENT)
Dept: OUTPATIENT SERVICES | Facility: HOSPITAL | Age: 65
LOS: 1 days | End: 2025-02-19

## 2025-02-19 DIAGNOSIS — Z87.19 PERSONAL HISTORY OF OTHER DISEASES OF THE DIGESTIVE SYSTEM: Chronic | ICD-10-CM

## 2025-02-19 DIAGNOSIS — K08.409 PARTIAL LOSS OF TEETH, UNSPECIFIED CAUSE, UNSPECIFIED CLASS: ICD-10-CM

## 2025-02-19 DIAGNOSIS — Z12.11 ENCOUNTER FOR SCREENING FOR MALIGNANT NEOPLASM OF COLON: Chronic | ICD-10-CM

## 2025-03-05 ENCOUNTER — OUTPATIENT (OUTPATIENT)
Dept: OUTPATIENT SERVICES | Facility: HOSPITAL | Age: 65
LOS: 1 days | End: 2025-03-05

## 2025-03-05 DIAGNOSIS — K08.409 PARTIAL LOSS OF TEETH, UNSPECIFIED CAUSE, UNSPECIFIED CLASS: ICD-10-CM

## 2025-03-05 DIAGNOSIS — Z87.19 PERSONAL HISTORY OF OTHER DISEASES OF THE DIGESTIVE SYSTEM: Chronic | ICD-10-CM

## 2025-03-05 DIAGNOSIS — Z12.11 ENCOUNTER FOR SCREENING FOR MALIGNANT NEOPLASM OF COLON: Chronic | ICD-10-CM

## 2025-03-19 ENCOUNTER — OUTPATIENT (OUTPATIENT)
Dept: OUTPATIENT SERVICES | Facility: HOSPITAL | Age: 65
LOS: 1 days | End: 2025-03-19

## 2025-03-19 DIAGNOSIS — Z87.19 PERSONAL HISTORY OF OTHER DISEASES OF THE DIGESTIVE SYSTEM: Chronic | ICD-10-CM

## 2025-03-19 DIAGNOSIS — Z12.11 ENCOUNTER FOR SCREENING FOR MALIGNANT NEOPLASM OF COLON: Chronic | ICD-10-CM

## 2025-03-19 DIAGNOSIS — K08.409 PARTIAL LOSS OF TEETH, UNSPECIFIED CAUSE, UNSPECIFIED CLASS: ICD-10-CM

## 2025-03-19 PROCEDURE — D2394: CPT

## 2025-03-21 DIAGNOSIS — K02.9 DENTAL CARIES, UNSPECIFIED: ICD-10-CM

## 2025-04-30 ENCOUNTER — OUTPATIENT (OUTPATIENT)
Dept: OUTPATIENT SERVICES | Facility: HOSPITAL | Age: 65
LOS: 1 days | End: 2025-04-30
Payer: MEDICAID

## 2025-04-30 DIAGNOSIS — K08.409 PARTIAL LOSS OF TEETH, UNSPECIFIED CAUSE, UNSPECIFIED CLASS: ICD-10-CM

## 2025-04-30 DIAGNOSIS — Z12.11 ENCOUNTER FOR SCREENING FOR MALIGNANT NEOPLASM OF COLON: Chronic | ICD-10-CM

## 2025-04-30 DIAGNOSIS — Z87.19 PERSONAL HISTORY OF OTHER DISEASES OF THE DIGESTIVE SYSTEM: Chronic | ICD-10-CM

## 2025-04-30 PROCEDURE — D5213: CPT

## 2025-05-07 ENCOUNTER — OUTPATIENT (OUTPATIENT)
Dept: OUTPATIENT SERVICES | Facility: HOSPITAL | Age: 65
LOS: 1 days | End: 2025-05-07
Payer: MEDICAID

## 2025-05-07 DIAGNOSIS — Z87.19 PERSONAL HISTORY OF OTHER DISEASES OF THE DIGESTIVE SYSTEM: Chronic | ICD-10-CM

## 2025-05-07 DIAGNOSIS — K08.409 PARTIAL LOSS OF TEETH, UNSPECIFIED CAUSE, UNSPECIFIED CLASS: ICD-10-CM

## 2025-05-07 DIAGNOSIS — Z12.11 ENCOUNTER FOR SCREENING FOR MALIGNANT NEOPLASM OF COLON: Chronic | ICD-10-CM

## 2025-05-07 PROCEDURE — D5213: CPT

## 2025-05-20 DIAGNOSIS — K08.409 PARTIAL LOSS OF TEETH, UNSPECIFIED CAUSE, UNSPECIFIED CLASS: ICD-10-CM

## 2025-05-21 ENCOUNTER — OUTPATIENT (OUTPATIENT)
Dept: OUTPATIENT SERVICES | Facility: HOSPITAL | Age: 65
LOS: 1 days | End: 2025-05-21

## 2025-05-21 DIAGNOSIS — K08.409 PARTIAL LOSS OF TEETH, UNSPECIFIED CAUSE, UNSPECIFIED CLASS: ICD-10-CM

## 2025-05-21 DIAGNOSIS — Z12.11 ENCOUNTER FOR SCREENING FOR MALIGNANT NEOPLASM OF COLON: Chronic | ICD-10-CM

## 2025-05-21 DIAGNOSIS — Z87.19 PERSONAL HISTORY OF OTHER DISEASES OF THE DIGESTIVE SYSTEM: Chronic | ICD-10-CM

## 2025-07-30 NOTE — ED PROVIDER NOTE - TOBACCO USE
How Severe Is Your Skin Discoloration?: moderate
Additional History: The discoloration on his eyebrow is lighter than skin tone and happened after a trip to [place] in 2011. The discoloration around his jaw is darker than skin color from an injury that happened in April 2025.
Unknown if ever smoked

## 2025-07-31 ENCOUNTER — APPOINTMENT (OUTPATIENT)
Dept: OTOLARYNGOLOGY | Facility: CLINIC | Age: 65
End: 2025-07-31
Payer: COMMERCIAL

## 2025-07-31 ENCOUNTER — NON-APPOINTMENT (OUTPATIENT)
Age: 65
End: 2025-07-31

## 2025-07-31 VITALS — HEIGHT: 74 IN | BODY MASS INDEX: 25.67 KG/M2 | WEIGHT: 200 LBS

## 2025-07-31 DIAGNOSIS — H93.11 TINNITUS, RIGHT EAR: ICD-10-CM

## 2025-07-31 DIAGNOSIS — H90.3 SENSORINEURAL HEARING LOSS, BILATERAL: ICD-10-CM

## 2025-07-31 PROCEDURE — 92557 COMPREHENSIVE HEARING TEST: CPT

## 2025-07-31 PROCEDURE — 92567 TYMPANOMETRY: CPT

## 2025-07-31 PROCEDURE — 99204 OFFICE O/P NEW MOD 45 MIN: CPT

## 2025-08-01 PROBLEM — H90.3 SENSORINEURAL HEARING LOSS (SNHL) OF BOTH EARS: Status: ACTIVE | Noted: 2025-08-01

## 2025-08-18 ENCOUNTER — OUTPATIENT (OUTPATIENT)
Dept: OUTPATIENT SERVICES | Facility: HOSPITAL | Age: 65
LOS: 1 days | End: 2025-08-18
Payer: MEDICAID

## 2025-08-18 DIAGNOSIS — Z12.11 ENCOUNTER FOR SCREENING FOR MALIGNANT NEOPLASM OF COLON: Chronic | ICD-10-CM

## 2025-08-18 DIAGNOSIS — K08.409 PARTIAL LOSS OF TEETH, UNSPECIFIED CAUSE, UNSPECIFIED CLASS: ICD-10-CM

## 2025-08-18 DIAGNOSIS — Z87.19 PERSONAL HISTORY OF OTHER DISEASES OF THE DIGESTIVE SYSTEM: Chronic | ICD-10-CM

## 2025-08-18 PROCEDURE — D2920: CPT

## 2025-08-20 DIAGNOSIS — K02.9 DENTAL CARIES, UNSPECIFIED: ICD-10-CM
